# Patient Record
Sex: FEMALE | Race: WHITE | Employment: FULL TIME | URBAN - METROPOLITAN AREA
[De-identification: names, ages, dates, MRNs, and addresses within clinical notes are randomized per-mention and may not be internally consistent; named-entity substitution may affect disease eponyms.]

---

## 2021-03-22 ENCOUNTER — HOSPITAL ENCOUNTER (OUTPATIENT)
Dept: GENERAL RADIOLOGY | Age: 66
Discharge: HOME | End: 2021-03-22

## 2021-03-22 DIAGNOSIS — M25.572 LEFT ANKLE PAIN, UNSPECIFIED CHRONICITY: ICD-10-CM

## 2021-03-22 NOTE — PROGRESS NOTES
Negative X ray of left ankle. Pt called and notified of results. Pt was advised to continue with her antibiotis as prescribed and follow up in 1-2 weeks.

## 2021-07-07 ENCOUNTER — HOSPITAL ENCOUNTER (OUTPATIENT)
Dept: WOUND CARE | Age: 66
Discharge: HOME | End: 2021-07-07
Attending: PODIATRIST
Payer: COMMERCIAL

## 2021-07-07 VITALS — BODY MASS INDEX: 22.48 KG/M2 | WEIGHT: 157 LBS | HEIGHT: 70 IN

## 2021-07-07 DIAGNOSIS — L97.929 CHRONIC VENOUS HYPERTENSION WITH ULCER AND INFLAMMATION INVOLVING LEFT SIDE (HCC): Primary | ICD-10-CM

## 2021-07-07 DIAGNOSIS — L97.322 NON-PRESSURE CHRONIC ULCER OF LEFT ANKLE WITH FAT LAYER EXPOSED (HCC): ICD-10-CM

## 2021-07-07 DIAGNOSIS — I87.332 CHRONIC VENOUS HYPERTENSION WITH ULCER AND INFLAMMATION INVOLVING LEFT SIDE (HCC): Primary | ICD-10-CM

## 2021-07-07 PROCEDURE — 99213 OFFICE O/P EST LOW 20 MIN: CPT

## 2021-07-07 NOTE — WOUND CARE
07/07/21 0953   Wound Ankle Left;Lateral 07/07/21   Date First Assessed: 07/07/21   Wound Approximate Age at First Assessment (Weeks): 6 weeks  Primary Wound Type: Venous Ulcer  Location: Ankle  Wound Location Orientation: Left;Lateral  Date of First Observation: 07/07/21   Wound Image    Wound Etiology Venous   Cleansed Cleansed with saline   Dressing/Treatment Open to air   Wound Length (cm) 2 cm   Wound Width (cm) 0.5 cm   Wound Depth (cm) 0.1 cm   Wound Surface Area (cm^2) 1 cm^2   Wound Volume (cm^3) 0.1 cm^3   Wound Assessment Slough   Drainage Amount Moderate   Drainage Description Serosanguinous   Wound Odor None   Jenny-Wound/Incision Assessment Blanchable erythema   Wound Thickness Description Full thickness

## 2021-07-07 NOTE — WOUND CARE
[address]  [address], [address]  Phone: [phone]  Fax: [phone]    Patient: Marina Geller MRN: [id number]  SSN: [id number]    YOB: 1955  Age: 63 y.o. Sex: female       Return Appointment: 2 weeks with Gayatri Sandra DPM    Instructions: Left Lateral Ankle:  Cleanse with Normal Saline/Soap and Water  Apply Xeroform- apply to wound bed. Cover with Gauze/ABD  Wrap with Rolled Gauze/Coversite  Change Dressing Every Other Day    DO NOT GET WOUND WET-PURCHASE CAST COVER  [facility]    Referral Sent to Dr Roselia Jeong for Reflux Studies    Should you experience increased redness, swelling, pain, foul odor, size of wound(s), or have a temperature over 101 degrees please contact the [address] [place] [address] Road at [phone] or if after hours contact your primary care physician or go to the hospital emergency department.     Signed By: Julius Metcalf RN     July 7, 2021

## 2021-07-07 NOTE — DISCHARGE INSTRUCTIONS
[address]  [address], [address]  Phone: [phone]  Fax: [phone]    Patient: Sukumar Christiansen MRN: [id number]  SSN: [id number]    YOB: 1955  Age: 63 y.o. Sex: female       Return Appointment: 2 weeks with Alphonso Charles DPM    Instructions: Left Lateral Ankle:  Cleanse with Normal Saline/Soap and Water  Apply Xeroform- apply to wound bed. Cover with Gauze/ABD  Wrap with Rolled Gauze/Coversite  Change Dressing Every Other Day    DO NOT GET WOUND WET-PURCHASE CAST COVER  [facility]    Referral Sent to Dr Dorita Minor for Reflux Studies    Should you experience increased redness, swelling, pain, foul odor, size of wound(s), or have a temperature over 101 degrees please contact the [address] [place] [address] Road at [phone] or if after hours contact your primary care physician or go to the hospital emergency department.     Signed By: Lauren Watters RN     July 7, 2021

## 2021-07-07 NOTE — PROGRESS NOTES
[address] [place] [address] Drive Kike King [place] RECORD NUMBER:  [id number]  AGE: 63 y.o. RACE WHITE/NON-  GENDER: female  : 1955  EPISODE DATE:  2021    Subjective:     Chief Complaint   Patient presents with   Zac Muse is a 63 y.o. WHITE/NON- female who presents with a wound to the left lower extremity at the lateral ankle. This began in 2021. She reports pain and edema to the left leg and presented to [facility] and her PCP since it began. She has been on two courses of oral antibiotics as well as using mupirocin ointment with no relief. She reports drainage towards the end of the day. 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 She does not wear compression. She was referred to [facility] but would like a different referral to a vascular surgeon. She reports significant burning pain. HISTORY of PRESENT ILLNESS HPI    Joshua Willis: burning pain  Location: lateral left ankle  Duration: 2021  Onset: Patient states it started as burning pain, edema  Course: stable  Aggravating/Alleviating: Worsened withdependency, improved  rest  Treatment: mupirocin    Ulcer Identification:  Ulcer Type: venous    Contributing Factors: edema and venous stasis    Wound: venous         PAST MEDICAL HISTORY    History reviewed. No pertinent past medical history.      PAST SURGICAL HISTORY    Past Surgical History:   Procedure Laterality Date    HX GYN      AL BREAST SURGERY PROCEDURE UNLISTED      Cyst Removal       FAMILY HISTORY    Family History   Problem Relation Age of Onset    Heart Disease Mother     Heart Disease Father        SOCIAL HISTORY    Social History     Tobacco Use    Smoking status: Never Smoker    Smokeless tobacco: Never Used   Vaping Use    Vaping Use: Never used   Substance Use Topics    Alcohol use: Yes     Comment: Occasionally    Drug use: Never       ALLERGIES    No Known Allergies    MEDICATIONS    Current Outpatient Medications on File Prior to Encounter   Medication Sig Dispense Refill    [] traMADoL (ULTRAM) 50 mg tablet Take 1 Tablet by mouth every six (6) hours as needed for Pain for up to 7 days. Max Daily Amount: 200 mg. 28 Tablet 0    multivitamin (ONE A DAY) tablet Take 1 Tab by mouth daily. No current facility-administered medications on file prior to encounter. REVIEW OF SYSTEMS    Pertinent items are noted in HPI. Objective:     Visit Vitals  BP (!) (P) 128/96 (BP 1 Location: Left upper arm)   Pulse (P) 100   Temp (P) 97 °F (36.1 °C)   Resp (P) 20   Ht 5' 10\" (1.778 m)   Wt 71.2 kg (157 lb)   SpO2 (P) 99%   BMI 22.53 kg/m²       Wt Readings from Last 3 Encounters:   21 71.2 kg (157 lb)   21 73.5 kg (162 lb)   21 75.3 kg (166 lb)       PHYSICAL EXAM    General: well developed, well nourished, pleasant , NAD. Hygiene good  Psych: cooperative. Pleasant. No anxiety or depression. Normal mood and affect. HEENT: Normocephalic, atraumatic. EOMI. Conjunctiva clear. No scleral icterus. Neck: Normal range of motion. No masses. Chest: Good air entry bilaterally. Respirations nonlabored  Cardio: Normal heart sounds,no rubs, murmurs or gallops  Abdomen: Soft, nontender, nondistended, normoactive bowel sounds    Vascular: DP and PT pulses are palpable at 2/4 bilateral. Skin temperature is uniform from proximal to distal bilateral. Hair growth is absent bilateral. No erythema, edema, heat is appreciated bilateral. No evidence of cellulitis. Derm: No paronychial infections are noted. Skin is atrophic and xerotic. No subcutaneous masses or hyperpigmented lesions are present.    Neuro: Epicritic sensation is present bilateral. Protective sensation is present with 5.07 SWMF testing to all 10 sites bilateral. Muscle tone and bulk is symmetric bilateral.  Msk: Muscle strength is 5/5 for all prime movers of the foot bilateral. ROM of all joints is full and fluid without pain. No effusions are palpable. Full thickness ulceration is noted to the lateral left ankle. infammed rim with fibrous base. No  purulence or odor. Soft end feel with no bone exposed or palpable. No undermining or sinus track is noted. No fluctuance with palpation. Wound Ankle Left;Lateral 07/07/21 (Active)   Wound Image   07/07/21 0953   Wound Etiology Venous 07/07/21 0953   Cleansed Cleansed with saline 07/07/21 0953   Dressing/Treatment Open to air 07/07/21 0953   Wound Length (cm) 2 cm 07/07/21 0953   Wound Width (cm) 0.5 cm 07/07/21 0953   Wound Depth (cm) 0.1 cm 07/07/21 0953   Wound Surface Area (cm^2) 1 cm^2 07/07/21 0953   Wound Volume (cm^3) 0.1 cm^3 07/07/21 0953   Wound Assessment [facility] 07/07/21 0953   Drainage Amount Moderate 07/07/21 0953   Drainage Description Serosanguinous 07/07/21 0953   Wound Odor None 07/07/21 0953   Jenny-Wound/Incision Assessment Blanchable erythema 07/07/21 0953   Wound Thickness Description Full thickness 07/07/21 0953   Number of days: 0       DATA REVIEW:  No results found for this or any previous visit (from the past 336 hour(s)). Assessment/Plan:      Problem List Items Addressed This Visit        Circulatory    Chronic venous hypertension with ulcer and inflammation involving left side ([address] - Primary    Relevant Orders    REFERRAL TO VASCULAR SURGERY       Other    Non-pressure chronic ulcer of left ankle with fat layer exposed ([address]    Relevant Orders    REFERRAL TO VASCULAR SURGERY        Patient examined and evaluated. Educated patient and barriers to healing. Infection control is required to prevent loss of limb/life - maintain dressing coverage at all times, do not soak or get wet in the shower, wash hands before and after dressing changes. Monitor for erythema, edema, odor, and thick yellow drainage and contact the center immediately if noted.      Proper nutrition to support skin growth includes increased protein, vitamins and amino acids - animal based foods and Terry recommended. Moisture management to prevent maceration and dryness - keep wound covered at all times outside dressing care, do not \"air out\" or soak. Dressing will consist of xeroform every other day. Edema control provided by use of tubigrip until vascular studies are performed. Referral to Dr. Kristen Sterling for venous evaluation. Return in 2 weeks. Patient instructed on the following: Follow all wound dressing instructions. Do not get dressing or wound wet. May shower if wound can be effectively kept dry. Cast covers may be purchased at [facility]. Should you experience increased redness, swelling, pain, foul odor, size of wound(s), or have a temperature over 101 degrees please contact the [address] [place] [address] Road at [phone] or if after hours contact your primary care physician or go to the hospital emergency department. Orders Placed This Encounter    REFERRAL TO VASCULAR SURGERY     Standing Status:   Future     Standing Expiration Date:   8/7/2021     Referral Type:   Consultation     Referral Reason:   Specialty Services Required     Referred to Provider:   Sima Byers MD     Number of Visits Requested:   1    WOUND CARE, DRESSING CHANGE     Left Lateral Ankle:  Cleanse with Normal Saline/Soap and Water  Apply Xeroform- apply to wound bed.   Cover with Gauze/ABD  Wrap with Rolled Gauze/Coversite  Change Dressing Every Other Day  Tubigrip to Left Lower Leg    DO NOT GET WOUND WET-PURCHASE CAST COVER  [facility]    Referral Sent to Dr Sergio Kyle for Reflux Studies     Standing Status:   Standing     Number of Occurrences:   1       Follow-up Information     Follow up With Specialties Details Why Contact Info    [address] In 2 weeks For wound re-check Vicente Chung [place] [id number] [address] [phone] MDM  Number of Diagnoses or Management Options  Chronic venous hypertension with ulcer and inflammation involving left side ([address]): new, needed workup  Non-pressure chronic ulcer of left ankle with fat layer exposed ([address]): new, needed workup     Amount and/or Complexity of Data Reviewed  Review and summarize past medical records: yes (PCP and [facility] notes)    Risk of Complications, Morbidity, and/or Mortality  Presenting problems: moderate  Diagnostic procedures: moderate  Management options: moderate    Patient Progress  Patient progress: other (comment) (new)      Treatment Note please see attached Discharge Instructions    Written patient dismissal instructions given to patient or POA.          Electronically signed by Milton Varela DPM on 7/7/2021 at 11:05 AM

## 2021-07-14 ENCOUNTER — HOSPITAL ENCOUNTER (OUTPATIENT)
Dept: WOUND CARE | Age: 66
Discharge: HOME | End: 2021-07-14
Attending: PODIATRIST
Payer: COMMERCIAL

## 2021-07-14 VITALS
RESPIRATION RATE: 18 BRPM | WEIGHT: 158 LBS | DIASTOLIC BLOOD PRESSURE: 80 MMHG | HEART RATE: 79 BPM | BODY MASS INDEX: 22.62 KG/M2 | HEIGHT: 70 IN | SYSTOLIC BLOOD PRESSURE: 106 MMHG | TEMPERATURE: 98.4 F

## 2021-07-14 DIAGNOSIS — I87.332 CHRONIC VENOUS HYPERTENSION WITH ULCER AND INFLAMMATION INVOLVING LEFT SIDE (HCC): Primary | ICD-10-CM

## 2021-07-14 DIAGNOSIS — L97.322 NON-PRESSURE CHRONIC ULCER OF LEFT ANKLE WITH FAT LAYER EXPOSED (HCC): ICD-10-CM

## 2021-07-14 DIAGNOSIS — L97.929 CHRONIC VENOUS HYPERTENSION WITH ULCER AND INFLAMMATION INVOLVING LEFT SIDE (HCC): Primary | ICD-10-CM

## 2021-07-14 PROCEDURE — 99213 OFFICE O/P EST LOW 20 MIN: CPT

## 2021-07-14 RX ORDER — TRAMADOL HYDROCHLORIDE 50 MG/1
50 TABLET ORAL
COMMUNITY
End: 2021-09-13

## 2021-07-14 NOTE — PROGRESS NOTES
[address] [place] [address] Drive Kike King [place] RECORD NUMBER:  [id number]  AGE: 63 y.o. RACE WHITE/NON-  GENDER: female  : 1955  EPISODE DATE:  2021    Subjective:     Chief Complaint   Patient presents with    Wound Check     Left lateral ankle wound      Laurie Wyatt is a 63 y.o. WHITE/NON- female who presents with a wound to the left lower extremity at the lateral ankle. She called the center for an urgent appointment due to noting increasing pain and erythema yesterday. She started with pain  night and left the dressing off overnight to air it out. Monday she replaced he xeroform and cover and then again on Tuesday. She notes that the xeroform was very dry and hard at dressing change and felt better with a fresh piece. She has her appointment with the vascular doctor on 21. HISTORY of PRESENT ILLNESS HPI     Nature: burning pain  Location: lateral left ankle  Duration: 2021  Onset: Patient states it started as burning pain, edema  Course: stable  Aggravating/Alleviating: Worsened withdependency, improved  rest  Treatment: xeroform     Ulcer Identification:  Ulcer Type: venous     Contributing Factors: edema and venous stasis     Wound: venous         PAST MEDICAL HISTORY    History reviewed. No pertinent past medical history.      PAST SURGICAL HISTORY    Past Surgical History:   Procedure Laterality Date    HX GYN      MD BREAST SURGERY PROCEDURE UNLISTED      Cyst Removal       FAMILY HISTORY    Family History   Problem Relation Age of Onset    Heart Disease Mother     Heart Disease Father        SOCIAL HISTORY    Social History     Tobacco Use    Smoking status: Never Smoker    Smokeless tobacco: Never Used   Vaping Use    Vaping Use: Never used   Substance Use Topics    Alcohol use: Yes     Comment: Occasionally    Drug use: Never       ALLERGIES    No Known Allergies    MEDICATIONS    Current Outpatient Medications on File Prior to Encounter   Medication Sig Dispense Refill    traMADoL (ULTRAM) 50 mg tablet Take 50 mg by mouth every six (6) hours as needed for Pain. Takes 1/2 tab as needed      multivitamin (ONE A DAY) tablet Take 1 Tab by mouth daily. No current facility-administered medications on file prior to encounter. REVIEW OF SYSTEMS    Pertinent items are noted in HPI. Objective:     Visit Vitals  /80 (BP 1 Location: Right upper arm)   Pulse 79   Temp 98.4 °F (36.9 °C)   Resp 18   Ht 5' 10\" (1.778 m)   Wt 71.7 kg (158 lb)   BMI 22.67 kg/m²       Wt Readings from Last 3 Encounters:   07/14/21 71.7 kg (158 lb)   07/07/21 71.2 kg (157 lb)   06/17/21 73.5 kg (162 lb)       PHYSICAL EXAM    General: well developed, well nourished, pleasant , NAD. Hygiene good  Psych: cooperative. Pleasant. No anxiety or depression. Normal mood and affect. HEENT: Normocephalic, atraumatic. EOMI. Conjunctiva clear. No scleral icterus. Neck: Normal range of motion. No masses. Chest: Good air entry bilaterally. Respirations nonlabored  Cardio: Normal heart sounds,no rubs, murmurs or gallops  Abdomen: Soft, nontender, nondistended, normoactive bowel sounds    Vascular: DP and PT pulses are palpable at 2/4 bilateral. Skin temperature is uniform from proximal to distal bilateral. Hair growth is absent bilateral. No erythema, edema, heat is appreciated bilateral. No evidence of cellulitis. Derm: No paronychial infections are noted. Skin is atrophic and xerotic. No subcutaneous masses or hyperpigmented lesions are present. Neuro: Epicritic sensation is present bilateral. Protective sensation is present with 5.07 SWMF testing to all 10 sites bilateral. Muscle tone and bulk is symmetric bilateral.  Msk: Muscle strength is 5/5 for all prime movers of the foot bilateral. ROM of all joints is full and fluid without pain.  No effusions are palpable.       Full thickness ulceration is noted to the lateral left ankle. Reduced erythema from last week. Fibrous base. No  purulence or odor. Soft end feel with no bone exposed or palpable. No undermining or sinus track is noted. No fluctuance with palpation. Evaluated her cell phone picture from yesterday which showed a increased erythema and inflammation that is absent on exam today. Wound Ankle Left;Lateral 07/07/21 (Active)   Wound Image   07/14/21 0959   Wound Etiology Venous 07/14/21 0959   Dressing Status Clean; Intact 07/14/21 0959   Cleansed Cleansed with saline 07/14/21 0959   Dressing/Treatment Open to air 07/07/21 0953   Wound Length (cm) 1.7 cm 07/14/21 0959   Wound Width (cm) 0.7 cm 07/14/21 0959   Wound Depth (cm) 0.1 cm 07/14/21 0959   Wound Surface Area (cm^2) 1.19 cm^2 07/14/21 0959   Change in Wound Size % -19 07/14/21 0959   Wound Volume (cm^3) 0.119 cm^3 07/14/21 0959   Wound Healing % -19 07/14/21 0959   Wound Assessment [facility] 07/14/21 0959   Drainage Amount Scant 07/14/21 0959   Drainage Description Serosanguinous 07/14/21 0959   Wound Odor None 07/14/21 0959   Jenny-Wound/Incision Assessment Blanchable erythema 07/14/21 0959   Wound Thickness Description Full thickness 07/14/21 0959   Number of days: 7       DATA REVIEW:  No results found for this or any previous visit (from the past 336 hour(s)). Assessment/Plan:      Problem List Items Addressed This Visit        Circulatory    Chronic venous hypertension with ulcer and inflammation involving left side ([address] - Primary       Other    Non-pressure chronic ulcer of left ankle with fat layer exposed ([address]        Wound is improved from last week and shows no signs of the inflammation in the photo. It is likely due to the xeroform getting dry and acting as a focal cast to the wound. Change the dressing daily to keep it soft. Keep her appointment with vascular next week. Push our appointment here out to the week after.     Patient instructed on the following: Follow all wound dressing instructions. Do not get dressing or wound wet. May shower if wound can be effectively kept dry. Cast covers may be purchased at [facility] and [facility]. Should you experience increased redness, swelling, pain, foul odor, size of wound(s), or have a temperature over 101 degrees please contact the [address] [place] [address] Road at [phone] or if after hours contact your primary care physician or go to the hospital emergency department. Orders Placed This Encounter    WOUND CARE, DRESSING CHANGE     Left Lateral Ankle:  Cleanse with Normal Saline/Soap and Water  Apply Xeroform- apply to wound bed. Cover with Gauze/ABD  Wrap with Coversite  Change Dressing Daily.     DO NOT GET WOUND WET-PURCHASE CAST COVER  [facility]     Follow up with Dr Marilyn Morales for Reflux Studies on 7/20/21. Standing Status:   Standing     Number of Occurrences:   1    traMADoL (ULTRAM) 50 mg tablet     Sig: Take 50 mg by mouth every six (6) hours as needed for Pain. Takes 1/2 tab as needed       Follow-up Information     Follow up With Specialties Details Why Contact Info     [address] In 2 weeks For wound re-check [place] [address] [facility] [address] [phone]         MDM  Number of Diagnoses or Management Options  Chronic venous hypertension with ulcer and inflammation involving left side ([address]): established, improving  Non-pressure chronic ulcer of left ankle with fat layer exposed ([address]): established, improving  Risk of Complications, Morbidity, and/or Mortality  Presenting problems: low  Management options: low    Patient Progress  Patient progress: improved      Treatment Note please see attached Discharge Instructions    Written patient dismissal instructions given to patient or POA.          Electronically signed by Hal Irvin DPM on 7/14/2021 at 10:34 AM

## 2021-07-14 NOTE — PROGRESS NOTES
07/14/21 0959   Wound Ankle Left;Lateral 07/07/21   Date First Assessed: 07/07/21   Wound Approximate Age at First Assessment (Weeks): 6 weeks  Primary Wound Type: Venous Ulcer  Location: Ankle  Wound Location Orientation: Left;Lateral  Date of First Observation: 07/07/21   Wound Image    Wound Etiology Venous   Dressing Status Clean; Intact   Cleansed Cleansed with saline   Dressing/Treatment   (Xeroform, gauze, Coversite)   Wound Length (cm) 1.7 cm   Wound Width (cm) 0.7 cm   Wound Depth (cm) 0.1 cm   Wound Surface Area (cm^2) 1.19 cm^2   Change in Wound Size % -19   Wound Volume (cm^3) 0.119 cm^3   Wound Healing % -19   Wound Assessment Slough   Drainage Amount Scant   Drainage Description Serosanguinous   Wound Odor None   Jenny-Wound/Incision Assessment Blanchable erythema   Wound Thickness Description Full thickness

## 2021-07-14 NOTE — DISCHARGE INSTRUCTIONS
[address]  [address], [address]  Phone: [phone]  Fax: [phone]    Patient: John Segura MRN: [id number]  SSN: [id number]    YOB: 1955  Age: 63 y.o. Sex: female       Return Appointment: 2 weeks with Arlene Erwin DPM    Instructions: Left Lateral Ankle:  Cleanse with Normal Saline/Soap and Water  Apply Xeroform- apply to wound bed. Cover with Gauze/ABD  Wrap with Coversite  Change Dressing Daily.     DO NOT GET WOUND WET-PURCHASE CAST COVER  [facility]     Follow up with Dr Tera Galdamez for Reflux Studies on 7/20/21. Should you experience increased redness, swelling, pain, foul odor, size of wound(s), or have a temperature over 101 degrees please contact the [address] [place] [address] Road at [phone] or if after hours contact your primary care physician or go to the hospital emergency department.     Signed By: Shae Ziegler PT, [facility]     July 14, 2021

## 2021-07-28 ENCOUNTER — HOSPITAL ENCOUNTER (OUTPATIENT)
Dept: WOUND CARE | Age: 66
Discharge: HOME | End: 2021-07-28
Attending: PODIATRIST
Payer: COMMERCIAL

## 2021-07-28 VITALS
HEART RATE: 107 BPM | SYSTOLIC BLOOD PRESSURE: 121 MMHG | RESPIRATION RATE: 18 BRPM | TEMPERATURE: 97.9 F | WEIGHT: 158 LBS | BODY MASS INDEX: 22.62 KG/M2 | DIASTOLIC BLOOD PRESSURE: 85 MMHG | HEIGHT: 70 IN

## 2021-07-28 DIAGNOSIS — L97.929 CHRONIC VENOUS HYPERTENSION WITH ULCER AND INFLAMMATION INVOLVING LEFT SIDE (HCC): Primary | ICD-10-CM

## 2021-07-28 DIAGNOSIS — I87.332 CHRONIC VENOUS HYPERTENSION WITH ULCER AND INFLAMMATION INVOLVING LEFT SIDE (HCC): Primary | ICD-10-CM

## 2021-07-28 DIAGNOSIS — L97.322 NON-PRESSURE CHRONIC ULCER OF LEFT ANKLE WITH FAT LAYER EXPOSED (HCC): ICD-10-CM

## 2021-07-28 PROCEDURE — 99212 OFFICE O/P EST SF 10 MIN: CPT

## 2021-07-28 RX ORDER — LIDOCAINE HYDROCHLORIDE 20 MG/ML
JELLY TOPICAL ONCE
Status: CANCELLED | OUTPATIENT
Start: 2021-07-28 | End: 2021-07-28

## 2021-07-28 RX ORDER — LIDOCAINE HYDROCHLORIDE 40 MG/ML
SOLUTION TOPICAL ONCE
Status: CANCELLED | OUTPATIENT
Start: 2021-07-28 | End: 2021-07-28

## 2021-07-28 RX ORDER — BACITRACIN ZINC AND POLYMYXIN B SULFATE 500; 1000 [USP'U]/G; [USP'U]/G
OINTMENT TOPICAL ONCE
Status: CANCELLED | OUTPATIENT
Start: 2021-07-28 | End: 2021-07-28

## 2021-07-28 RX ORDER — SILVER SULFADIAZINE 10 G/1000G
CREAM TOPICAL ONCE
Status: CANCELLED | OUTPATIENT
Start: 2021-07-28 | End: 2021-07-28

## 2021-07-28 RX ORDER — GENTAMICIN SULFATE 1 MG/G
OINTMENT TOPICAL ONCE
Status: CANCELLED | OUTPATIENT
Start: 2021-07-28 | End: 2021-07-28

## 2021-07-28 RX ORDER — LIDOCAINE 50 MG/G
OINTMENT TOPICAL ONCE
Status: CANCELLED | OUTPATIENT
Start: 2021-07-28 | End: 2021-07-28

## 2021-07-28 RX ORDER — LIDOCAINE 40 MG/G
CREAM TOPICAL ONCE
Status: CANCELLED | OUTPATIENT
Start: 2021-07-28 | End: 2021-07-28

## 2021-07-28 RX ORDER — MUPIROCIN 20 MG/G
OINTMENT TOPICAL ONCE
Status: CANCELLED | OUTPATIENT
Start: 2021-07-28 | End: 2021-07-28

## 2021-07-28 RX ORDER — TRIAMCINOLONE ACETONIDE 1 MG/G
OINTMENT TOPICAL ONCE
Status: CANCELLED | OUTPATIENT
Start: 2021-07-28 | End: 2021-07-28

## 2021-07-28 RX ORDER — BETAMETHASONE DIPROPIONATE 0.5 MG/G
OINTMENT TOPICAL ONCE
Status: CANCELLED | OUTPATIENT
Start: 2021-07-28 | End: 2021-07-28

## 2021-07-28 RX ORDER — BACITRACIN 500 [USP'U]/G
OINTMENT TOPICAL ONCE
Status: CANCELLED | OUTPATIENT
Start: 2021-07-28 | End: 2021-07-28

## 2021-07-28 RX ORDER — CLOBETASOL PROPIONATE 0.5 MG/G
OINTMENT TOPICAL ONCE
Status: CANCELLED | OUTPATIENT
Start: 2021-07-28 | End: 2021-07-28

## 2021-07-28 NOTE — DISCHARGE INSTRUCTIONS
[address]  [address], [address]   Phone: [phone]  Fax: [phone]    Patient: Manjeet Melvin MRN: [id number]  SSN: [id number]    YOB: 1955  Age: 63 y.o. Sex: female       Return Appointment: 2 weeks with Cyndie Hale DPM    Instructions:  Left Lateral Ankle:  Cleanse with Normal Saline/Soap and Water  Apply Xeroform- apply to wound bed. Cover with Kaleta Yeyo with Coversite  Change Dressing Daily. Should you experience increased redness, swelling, pain, foul odor, size of wound(s), or have a temperature over 101 degrees please contact the [address] [place] [address] Road at [phone] or if after hours contact your primary care physician or go to the hospital emergency department.     Signed By: Anastasia Balbuena     July 28, 2021

## 2021-07-28 NOTE — PROGRESS NOTES
[address] [place] [address] RECORD NUMBER:  [id number]  AGE: 63 y.o. RACE WHITE/NON-  GENDER: female  : 1955  EPISODE DATE:  2021    Subjective:     Chief Complaint   Patient presents with    Follow-up     left ankle      Marko Hou is a 63 y.o. WHITE/NON- female who presents with a wound to the left lower extremity at the lateral ankle. She notes reduced pain and less drainage. She had her consultation with Dr. Judith Calle and reports that a clot was noted in a superficial vein behind her knee however Dr. Judith Calle told her this may be incidental due to the location and did not recommend intervention at this time. HISTORY of PRESENT ILLNESS HPI     Nature: burning pain  Location: lateral left ankle  Duration: 2021  Onset: Patient states it started as burning pain, edema  Course: improving  Aggravating/Alleviating: Worsened withdependency, improved  rest  Treatment: xeroform     Ulcer Identification:  Ulcer Type: venous     Contributing Factors: edema and venous stasis     Wound: venous         PAST MEDICAL HISTORY    History reviewed. No pertinent past medical history.      PAST SURGICAL HISTORY    Past Surgical History:   Procedure Laterality Date    HX GYN      RI BREAST SURGERY PROCEDURE UNLISTED      Cyst Removal       FAMILY HISTORY    Family History   Problem Relation Age of Onset    Heart Disease Mother     Heart Disease Father        SOCIAL HISTORY    Social History     Tobacco Use    Smoking status: Never Smoker    Smokeless tobacco: Never Used   Vaping Use    Vaping Use: Never used   Substance Use Topics    Alcohol use: Yes     Comment: Occasionally    Drug use: Never       ALLERGIES    No Known Allergies    MEDICATIONS    Current Outpatient Medications on File Prior to Encounter   Medication Sig Dispense Refill    traMADoL (ULTRAM) 50 mg tablet Take 50 mg by mouth every six (6) hours as needed for Pain. Takes 1/2 tab as needed      multivitamin (ONE A DAY) tablet Take 1 Tab by mouth daily. No current facility-administered medications on file prior to encounter. REVIEW OF SYSTEMS    Pertinent items are noted in HPI. Objective:     Visit Vitals  /85 (BP 1 Location: Left upper arm, BP Patient Position: Sitting)   Pulse (!) 107   Temp 97.9 °F (36.6 °C)   Resp 18   Ht 5' 10\" (1.778 m)   Wt 71.7 kg (158 lb)   BMI 22.67 kg/m²       Wt Readings from Last 3 Encounters:   07/28/21 71.7 kg (158 lb)   07/14/21 71.7 kg (158 lb)   07/07/21 71.2 kg (157 lb)       PHYSICAL EXAM    General: well developed, well nourished, pleasant , NAD. Hygiene good  Psych: cooperative. Pleasant. No anxiety or depression. Normal mood and affect. HEENT: Normocephalic, atraumatic. EOMI. Conjunctiva clear. No scleral icterus. Neck: Normal range of motion. No masses. Chest: Good air entry bilaterally. Respirations nonlabored  Cardio: Normal heart sounds,no rubs, murmurs or gallops  Abdomen: Soft, nontender, nondistended, normoactive bowel sounds    Vascular: DP and PT pulses are palpable at 2/4 bilateral. Skin temperature is uniform from proximal to distal bilateral. Hair growth is absent bilateral. No erythema, edema, heat is appreciated bilateral. No evidence of cellulitis. Derm: No paronychial infections are noted. Skin is atrophic and xerotic. No subcutaneous masses or hyperpigmented lesions are present. Neuro: Epicritic sensation is present bilateral. Protective sensation is present with 5.07 SWMF testing to all 10 sites bilateral. Muscle tone and bulk is symmetric bilateral.  Msk: Muscle strength is 5/5 for all prime movers of the foot bilateral. ROM of all joints is full and fluid without pain. No effusions are palpable.       Full thickness ulceration is noted to the lateral left ankle. Dry base. No  purulence or odor. Soft end feel with no bone exposed or palpable. No undermining or sinus track is noted. No fluctuance with palpation. Wound Ankle Left;Lateral 07/07/21 (Active)   Wound Image   07/28/21 0829   Wound Etiology Venous 07/28/21 0829   Dressing Status Clean;Dry; Intact 07/28/21 0829   Cleansed Cleansed with saline 07/28/21 0829   Dressing/Treatment Open to air 07/07/21 0953   Wound Length (cm) 0.3 cm 07/28/21 0829   Wound Width (cm) 0.3 cm 07/28/21 0829   Wound Depth (cm) 0.1 cm 07/28/21 0829   Wound Surface Area (cm^2) 0.09 cm^2 07/28/21 0829   Change in Wound Size % 91 07/28/21 0829   Wound Volume (cm^3) 0.009 cm^3 07/28/21 0829   Wound Healing % 91 07/28/21 0829   Wound Assessment [facility] 07/28/21 0829   Drainage Amount Scant 07/28/21 0829   Drainage Description Serous 07/28/21 0829   Wound Odor None 07/28/21 0829   Jenny-Wound/Incision Assessment Blanchable erythema 07/28/21 0829   Wound Thickness Description Full thickness 07/28/21 0829   Number of days: 21     DATA REVIEW:  No results found for this or any previous visit (from the past 336 hour(s)). Assessment/Plan:      Problem List Items Addressed This Visit        Circulatory    Chronic venous hypertension with ulcer and inflammation involving left side ([address] - Primary    Relevant Orders    INITIATE OUTPATIENT WOUND CARE PROTOCOL       Other    Non-pressure chronic ulcer of left ankle with fat layer exposed ([address]    Relevant Orders    INITIATE OUTPATIENT WOUND CARE PROTOCOL        Wound is improved. Continue daily xeroform changes and compression. Return in 2 weeks. Patient instructed on the following: Follow all wound dressing instructions. Do not get dressing or wound wet. May shower if wound can be effectively kept dry. Cast covers may be purchased at [facility] and [facility].     Should you experience increased redness, swelling, pain, foul odor, size of wound(s), or have a temperature over 101 degrees please contact the [address] [place] [address] Road at [phone] or if after hours contact your primary care physician or go to the hospital emergency department. Follow-up Information     Follow up With Specialties Details Why Contact Info    [address] In 2 weeks  [address] [place] [id number] [facility] [address] [phone]         MDM  Number of Diagnoses or Management Options  Chronic venous hypertension with ulcer and inflammation involving left side ([address]): established, improving  Non-pressure chronic ulcer of left ankle with fat layer exposed ([address]): established, improving  Risk of Complications, Morbidity, and/or Mortality  Presenting problems: low  Management options: low    Patient Progress  Patient progress: improved      Treatment Note please see attached Discharge Instructions    Written patient dismissal instructions given to patient or POA.          Electronically signed by Katya Cazares DPM on 7/28/2021 at 10:34 AM

## 2021-07-28 NOTE — WOUND CARE
07/28/21 0829   Wound Ankle Left;Lateral 07/07/21   Date First Assessed: 07/07/21   Wound Approximate Age at First Assessment (Weeks): 6 weeks  Primary Wound Type: Venous Ulcer  Location: Ankle  Wound Location Orientation: Left;Lateral  Date of First Observation: 07/07/21   Wound Image    Wound Etiology Venous   Dressing Status Clean;Dry; Intact   Cleansed Cleansed with saline   Dressing/Treatment   (xeroform, gauze)   Wound Length (cm) 0.3 cm   Wound Width (cm) 0.3 cm   Wound Depth (cm) 0.1 cm   Wound Surface Area (cm^2) 0.09 cm^2   Change in Wound Size % 91   Wound Volume (cm^3) 0.009 cm^3   Wound Healing % 91   Wound Assessment Slough   Drainage Amount Scant   Drainage Description Serous   Wound Odor None   Jenny-Wound/Incision Assessment Blanchable erythema   Wound Thickness Description Full thickness

## 2021-07-28 NOTE — WOUND CARE
[address], [address]  Phone: [phone]  Fax: [phone]    Patient: Sean Rowan MRN: [id number]  SSN: [id number]    YOB: 1955  Age: 63 y.o. Sex: female       Return Appointment: 2 weeks with Katty Nguyen DPM    Instructions:  Left Lateral Ankle:  Cleanse with Normal Saline/Soap and Water  Apply Xeroform- apply to wound bed. Cover with Gauze/ABD or secure with border foam.  Change Dressing Daily. Should you experience increased redness, swelling, pain, foul odor, size of wound(s), or have a temperature over 101 degrees please contact the [address] [place] [address] Road at [phone] or if after hours contact your primary care physician or go to the hospital emergency department.     Signed By: Brian Hong     July 28, 2021

## 2021-08-11 ENCOUNTER — HOSPITAL ENCOUNTER (OUTPATIENT)
Dept: WOUND CARE | Age: 66
Discharge: HOME | End: 2021-08-11
Attending: PODIATRIST
Payer: COMMERCIAL

## 2021-08-11 VITALS — DIASTOLIC BLOOD PRESSURE: 89 MMHG | RESPIRATION RATE: 18 BRPM | HEART RATE: 111 BPM | SYSTOLIC BLOOD PRESSURE: 131 MMHG

## 2021-08-11 DIAGNOSIS — L97.322 NON-PRESSURE CHRONIC ULCER OF LEFT ANKLE WITH FAT LAYER EXPOSED (HCC): ICD-10-CM

## 2021-08-11 DIAGNOSIS — I87.332 CHRONIC VENOUS HYPERTENSION WITH ULCER AND INFLAMMATION INVOLVING LEFT SIDE (HCC): Primary | ICD-10-CM

## 2021-08-11 DIAGNOSIS — L97.929 CHRONIC VENOUS HYPERTENSION WITH ULCER AND INFLAMMATION INVOLVING LEFT SIDE (HCC): Primary | ICD-10-CM

## 2021-08-11 PROCEDURE — 99212 OFFICE O/P EST SF 10 MIN: CPT

## 2021-08-11 RX ORDER — LIDOCAINE HYDROCHLORIDE 20 MG/ML
JELLY TOPICAL ONCE
Status: CANCELLED | OUTPATIENT
Start: 2021-08-11 | End: 2021-08-11

## 2021-08-11 RX ORDER — BACITRACIN 500 [USP'U]/G
OINTMENT TOPICAL ONCE
Status: CANCELLED | OUTPATIENT
Start: 2021-08-11 | End: 2021-08-11

## 2021-08-11 RX ORDER — LIDOCAINE 50 MG/G
OINTMENT TOPICAL ONCE
Status: CANCELLED | OUTPATIENT
Start: 2021-08-11 | End: 2021-08-11

## 2021-08-11 RX ORDER — BACITRACIN ZINC AND POLYMYXIN B SULFATE 500; 1000 [USP'U]/G; [USP'U]/G
OINTMENT TOPICAL ONCE
Status: CANCELLED | OUTPATIENT
Start: 2021-08-11 | End: 2021-08-11

## 2021-08-11 RX ORDER — BETAMETHASONE DIPROPIONATE 0.5 MG/G
OINTMENT TOPICAL ONCE
Status: CANCELLED | OUTPATIENT
Start: 2021-08-11 | End: 2021-08-11

## 2021-08-11 RX ORDER — GENTAMICIN SULFATE 1 MG/G
OINTMENT TOPICAL ONCE
Status: CANCELLED | OUTPATIENT
Start: 2021-08-11 | End: 2021-08-11

## 2021-08-11 RX ORDER — SILVER SULFADIAZINE 10 G/1000G
CREAM TOPICAL ONCE
Status: CANCELLED | OUTPATIENT
Start: 2021-08-11 | End: 2021-08-11

## 2021-08-11 RX ORDER — CLOBETASOL PROPIONATE 0.5 MG/G
OINTMENT TOPICAL ONCE
Status: CANCELLED | OUTPATIENT
Start: 2021-08-11 | End: 2021-08-11

## 2021-08-11 RX ORDER — TRIAMCINOLONE ACETONIDE 1 MG/G
OINTMENT TOPICAL ONCE
Status: CANCELLED | OUTPATIENT
Start: 2021-08-11 | End: 2021-08-11

## 2021-08-11 RX ORDER — LIDOCAINE 40 MG/G
CREAM TOPICAL ONCE
Status: CANCELLED | OUTPATIENT
Start: 2021-08-11 | End: 2021-08-11

## 2021-08-11 RX ORDER — LIDOCAINE HYDROCHLORIDE 40 MG/ML
SOLUTION TOPICAL ONCE
Status: CANCELLED | OUTPATIENT
Start: 2021-08-11 | End: 2021-08-11

## 2021-08-11 RX ORDER — MUPIROCIN 20 MG/G
OINTMENT TOPICAL ONCE
Status: CANCELLED | OUTPATIENT
Start: 2021-08-11 | End: 2021-08-11

## 2021-08-11 NOTE — WOUND CARE
[address]  [address], [address]   Phone: [phone]  Fax: [phone]    Patient: Lilibeth Bangura MRN: [id number]  SSN: [id number]    YOB: 1955  Age: 63 y.o. Sex: female       Return Appointment: 2 weeks with Trace Alarcon DPM    Instructions: left lateral ankle  Cleanse wound with normal saline. Xeroform- apply to wound bed. Cover with gauze, secure with paper tape. Hydrocortisone cream 1% applied to surrounding skin today. Change dressing daily. Should you experience increased redness, swelling, pain, foul odor, size of wound(s), or have a temperature over 101 degrees please contact the [address] [place] [address] Road at [phone] or if after hours contact your primary care physician or go to the hospital emergency department.     Signed By: Sidney Foote RN     August 11, 2021

## 2021-08-11 NOTE — PROGRESS NOTES
[address] [place] [address] Drive [address] [place] RECORD NUMBER:  [id number]  AGE: 63 y.o. RACE WHITE/NON-  GENDER: female  : 1955  EPISODE DATE:  2021    Subjective:     Chief Complaint   Patient presents with    Follow-up     right ankle      Manjeet Melvin is a 63 y.o. WHITE/NON- female who presents with a wound to the left lower extremity at the lateral ankle. She notes reduced pain and scant drainage. She is using tubigrip for compression. She denies pain remaining. HISTORY of PRESENT ILLNESS HPI     Nature: painless  Location: lateral left ankle  Duration: 2021  Onset: Patient states it started as burning pain, edema  Course: improving  Aggravating/Alleviating: Worsened withdependency, improved  rest  Treatment: xeroform     Ulcer Identification:  Ulcer Type: venous     Contributing Factors: edema and venous stasis     Wound: venous         PAST MEDICAL HISTORY    History reviewed. No pertinent past medical history. PAST SURGICAL HISTORY    Past Surgical History:   Procedure Laterality Date    HX GYN      DC BREAST SURGERY PROCEDURE UNLISTED      Cyst Removal       FAMILY HISTORY    Family History   Problem Relation Age of Onset    Heart Disease Mother     Heart Disease Father        SOCIAL HISTORY    Social History     Tobacco Use    Smoking status: Never Smoker    Smokeless tobacco: Never Used   Vaping Use    Vaping Use: Never used   Substance Use Topics    Alcohol use: Yes     Comment: Occasionally    Drug use: Never       ALLERGIES    No Known Allergies    MEDICATIONS    Current Outpatient Medications on File Prior to Encounter   Medication Sig Dispense Refill    traMADoL (ULTRAM) 50 mg tablet Take 50 mg by mouth every six (6) hours as needed for Pain. Takes 1/2 tab as needed      multivitamin (ONE A DAY) tablet Take 1 Tab by mouth daily.        No current facility-administered medications on file prior to encounter. REVIEW OF SYSTEMS    Pertinent items are noted in HPI. Objective:     Visit Vitals  /89 (BP 1 Location: Right upper arm, BP Patient Position: Sitting)   Pulse (!) 111   Resp 18       Wt Readings from Last 3 Encounters:   07/28/21 71.7 kg (158 lb)   07/14/21 71.7 kg (158 lb)   07/07/21 71.2 kg (157 lb)       PHYSICAL EXAM    General: well developed, well nourished, pleasant , NAD. Hygiene good  Psych: cooperative. Pleasant. No anxiety or depression. Normal mood and affect. HEENT: Normocephalic, atraumatic. EOMI. Conjunctiva clear. No scleral icterus. Neck: Normal range of motion. No masses. Chest: Good air entry bilaterally. Respirations nonlabored  Cardio: Normal heart sounds,no rubs, murmurs or gallops  Abdomen: Soft, nontender, nondistended, normoactive bowel sounds    Vascular: DP and PT pulses are palpable at 2/4 bilateral. Skin temperature is uniform from proximal to distal bilateral. Hair growth is absent bilateral. No erythema, edema, heat is appreciated bilateral. No evidence of cellulitis. Derm: No paronychial infections are noted. Skin is atrophic and xerotic. No subcutaneous masses or hyperpigmented lesions are present. Neuro: Epicritic sensation is present bilateral. Protective sensation is present with 5.07 SWMF testing to all 10 sites bilateral. Muscle tone and bulk is symmetric bilateral.  Msk: Muscle strength is 5/5 for all prime movers of the foot bilateral. ROM of all joints is full and fluid without pain. No effusions are palpable.       Full thickness ulceration is noted to the lateral left ankle. Dry base. No  purulence or odor. Soft end feel with no bone exposed or palpable. No undermining or sinus track is noted. No fluctuance with palpation. Significant reduction in size with minimal opening remaining.       Wound Ankle Left;Lateral 07/07/21 (Active)   Wound Image   08/11/21 0920   Wound Etiology Venous 08/11/21 0920   Dressing Status Clean;Dry; Intact 08/11/21 0920   Cleansed Cleansed with saline 08/11/21 0920   Dressing/Treatment Open to air 07/07/21 0953   Wound Length (cm) 0.1 cm 08/11/21 0920   Wound Width (cm) 0.1 cm 08/11/21 0920   Wound Depth (cm) 0.1 cm 08/11/21 0920   Wound Surface Area (cm^2) 0.01 cm^2 08/11/21 0920   Change in Wound Size % 99 08/11/21 0920   Wound Volume (cm^3) 0.001 cm^3 08/11/21 0920   Wound Healing % 99 08/11/21 0920   Wound Assessment Slough 08/11/21 0920   Drainage Amount Scant 08/11/21 0920   Drainage Description Serous 08/11/21 0920   Wound Odor None 08/11/21 0920   Jenny-Wound/Incision Assessment Blanchable erythema 08/11/21 0920   Wound Thickness Description Full thickness 08/11/21 0920   Number of days: 35          DATA REVIEW:  No results found for this or any previous visit (from the past 336 hour(s)). Assessment/Plan:      Problem List Items Addressed This Visit        Circulatory    Chronic venous hypertension with ulcer and inflammation involving left side ([address] - Primary    Relevant Orders    INITIATE OUTPATIENT WOUND CARE PROTOCOL       Other    Non-pressure chronic ulcer of left ankle with fat layer exposed ([facility]    Relevant Orders    INITIATE OUTPATIENT WOUND CARE PROTOCOL        Wound is significantly improved and near healed. Continue daily xeroform changes and compression. Return in 2 weeks. Patient instructed on the following: Follow all wound dressing instructions. Do not get dressing or wound wet. May shower if wound can be effectively kept dry. Cast covers may be purchased at [facility] and [facility]. Should you experience increased redness, swelling, pain, foul odor, size of wound(s), or have a temperature over 101 degrees please contact the [address] [place] [address] Road at [phone] or if after hours contact your primary care physician or go to the hospital emergency department.          MDM  Number of Diagnoses or Management Options  Chronic venous hypertension with ulcer and inflammation involving left side ([address]): established, improving  Non-pressure chronic ulcer of left ankle with fat layer exposed ([facility]): established, improving  Risk of Complications, Morbidity, and/or Mortality  Presenting problems: low  Management options: low    Patient Progress  Patient progress: improved      Treatment Note please see attached Discharge Instructions    Written patient dismissal instructions given to patient or POA.          Electronically signed by Ella Mccormack DPM on 8/11/2021 at 10:34 AM

## 2021-08-11 NOTE — WOUND CARE
08/11/21 0920   Wound Ankle Left;Lateral 07/07/21   Date First Assessed: 07/07/21   Wound Approximate Age at First Assessment (Weeks): 6 weeks  Primary Wound Type: Venous Ulcer  Location: Ankle  Wound Location Orientation: Left;Lateral  Date of First Observation: 07/07/21   Wound Image    Wound Etiology Venous   Dressing Status Clean;Dry; Intact   Cleansed Cleansed with saline   Dressing/Treatment   (xerform, coversite)   Wound Length (cm) 0.1 cm   Wound Width (cm) 0.1 cm   Wound Depth (cm) 0.1 cm   Wound Surface Area (cm^2) 0.01 cm^2   Change in Wound Size % 99   Wound Volume (cm^3) 0.001 cm^3   Wound Healing % 99   Wound Assessment Slough   Drainage Amount Scant   Drainage Description Serous   Wound Odor None   Jenny-Wound/Incision Assessment Blanchable erythema   Wound Thickness Description Full thickness

## 2021-08-11 NOTE — DISCHARGE INSTRUCTIONS
[address]  [address], [address]  Phone: [phone]  Fax: [phone]    Patient: Theodore Champion MRN: [id number]  SSN: [id number]    YOB: 1955  Age: 63 y.o. Sex: female       Return Appointment: 2 weeks with Jeet Vaz DPM    Instructions:  left lateral ankle  Cleanse wound with normal saline. Xeroform- apply to wound bed. Cover with gauze, secure with paper tape. Hydrocortisone cream 1% applied to surrounding skin today. Change dressing daily. Should you experience increased redness, swelling, pain, foul odor, size of wound(s), or have a temperature over 101 degrees please contact the [address] [place] [address] Road at [phone] or if after hours contact your primary care physician or go to the hospital emergency department.     Signed By: Yazmin Pascual RN     August 11, 2021

## 2021-08-25 ENCOUNTER — HOSPITAL ENCOUNTER (OUTPATIENT)
Dept: WOUND CARE | Age: 66
Discharge: HOME | End: 2021-08-25
Attending: PODIATRIST
Payer: COMMERCIAL

## 2021-08-25 VITALS
SYSTOLIC BLOOD PRESSURE: 119 MMHG | TEMPERATURE: 99.1 F | HEIGHT: 70 IN | DIASTOLIC BLOOD PRESSURE: 76 MMHG | BODY MASS INDEX: 22.9 KG/M2 | HEART RATE: 115 BPM | OXYGEN SATURATION: 99 % | RESPIRATION RATE: 18 BRPM | WEIGHT: 160 LBS

## 2021-08-25 DIAGNOSIS — L97.929 CHRONIC VENOUS HYPERTENSION WITH ULCER AND INFLAMMATION INVOLVING LEFT SIDE (HCC): Primary | ICD-10-CM

## 2021-08-25 DIAGNOSIS — I87.332 CHRONIC VENOUS HYPERTENSION WITH ULCER AND INFLAMMATION INVOLVING LEFT SIDE (HCC): Primary | ICD-10-CM

## 2021-08-25 DIAGNOSIS — L97.322 NON-PRESSURE CHRONIC ULCER OF LEFT ANKLE WITH FAT LAYER EXPOSED (HCC): ICD-10-CM

## 2021-08-25 PROCEDURE — 99212 OFFICE O/P EST SF 10 MIN: CPT

## 2021-08-25 RX ORDER — BACITRACIN ZINC AND POLYMYXIN B SULFATE 500; 1000 [USP'U]/G; [USP'U]/G
OINTMENT TOPICAL ONCE
Status: CANCELLED | OUTPATIENT
Start: 2021-08-25 | End: 2021-08-25

## 2021-08-25 RX ORDER — LIDOCAINE 50 MG/G
OINTMENT TOPICAL ONCE
Status: CANCELLED | OUTPATIENT
Start: 2021-08-25 | End: 2021-08-25

## 2021-08-25 RX ORDER — BACITRACIN 500 [USP'U]/G
OINTMENT TOPICAL ONCE
Status: CANCELLED | OUTPATIENT
Start: 2021-08-25 | End: 2021-08-25

## 2021-08-25 RX ORDER — SILVER SULFADIAZINE 10 G/1000G
CREAM TOPICAL ONCE
Status: CANCELLED | OUTPATIENT
Start: 2021-08-25 | End: 2021-08-25

## 2021-08-25 RX ORDER — CLOBETASOL PROPIONATE 0.5 MG/G
OINTMENT TOPICAL ONCE
Status: CANCELLED | OUTPATIENT
Start: 2021-08-25 | End: 2021-08-25

## 2021-08-25 RX ORDER — TRIAMCINOLONE ACETONIDE 1 MG/G
OINTMENT TOPICAL ONCE
Status: CANCELLED | OUTPATIENT
Start: 2021-08-25 | End: 2021-08-25

## 2021-08-25 RX ORDER — LIDOCAINE HYDROCHLORIDE 20 MG/ML
JELLY TOPICAL ONCE
Status: CANCELLED | OUTPATIENT
Start: 2021-08-25 | End: 2021-08-25

## 2021-08-25 RX ORDER — MUPIROCIN 20 MG/G
OINTMENT TOPICAL ONCE
Status: CANCELLED | OUTPATIENT
Start: 2021-08-25 | End: 2021-08-25

## 2021-08-25 RX ORDER — LIDOCAINE 40 MG/G
CREAM TOPICAL ONCE
Status: CANCELLED | OUTPATIENT
Start: 2021-08-25 | End: 2021-08-25

## 2021-08-25 RX ORDER — LIDOCAINE HYDROCHLORIDE 40 MG/ML
SOLUTION TOPICAL ONCE
Status: CANCELLED | OUTPATIENT
Start: 2021-08-25 | End: 2021-08-25

## 2021-08-25 RX ORDER — BETAMETHASONE DIPROPIONATE 0.5 MG/G
OINTMENT TOPICAL ONCE
Status: CANCELLED | OUTPATIENT
Start: 2021-08-25 | End: 2021-08-25

## 2021-08-25 RX ORDER — GENTAMICIN SULFATE 1 MG/G
OINTMENT TOPICAL ONCE
Status: CANCELLED | OUTPATIENT
Start: 2021-08-25 | End: 2021-08-25

## 2021-08-25 NOTE — WOUND CARE
[address]  [address], [address]  Phone: [phone]  Fax: [phone]    Patient: Isai Ruano MRN: [id number]  SSN: [id number]    YOB: 1955  Age: 63 y.o. Sex: female       Return Appointment: Discharge from wound center at this time. Instructions: Left lateral ankle  Wound is healed! Patient may shower as previously. No need for dressings at this time. Discharge from wound center at this time.       Should you experience increased redness, swelling, pain, foul odor, size of wound(s), or have a temperature over 101 degrees please contact the [address] [place] [address] Road at [phone] or if after hours contact your primary care physician or go to the hospital emergency department.     Signed By: Wali Berman PT, [facility]     August 25, 2021

## 2021-08-25 NOTE — PROGRESS NOTES
08/25/21 0959   Wound Ankle Left;Lateral 07/07/21   Date First Assessed: 07/07/21   Wound Approximate Age at First Assessment (Weeks): 6 weeks  Primary Wound Type: Venous Ulcer  Location: Ankle  Wound Location Orientation: Left;Lateral  Date of First Observation: 07/07/21   Wound Image    Wound Etiology Venous   Dressing Status Clean;Dry; Intact   Cleansed Cleansed with saline   Dressing/Treatment   (Xeroform, band-aid)   Wound Length (cm) 0 cm   Wound Width (cm) 0 cm   Wound Depth (cm) 0 cm   Wound Surface Area (cm^2) 0 cm^2   Change in Wound Size % 100   Wound Volume (cm^3) 0 cm^3   Wound Healing % 100   Wound Assessment Epithelialization   Drainage Amount None   Wound Odor None   Jenny-Wound/Incision Assessment Blanchable erythema

## 2021-08-25 NOTE — PROGRESS NOTES
[address] [place] [address] Drive Kike [place] RECORD NUMBER:  [id number]  AGE: 63 y.o. RACE WHITE/NON-  GENDER: female  : 1955  EPISODE DATE:  2021    Subjective:     Chief Complaint   Patient presents with    Wound Check     Left lateral ankle wound      Sukumar Christiansen is a 63 y.o. WHITE/NON- female who presents with a wound to the left lower extremity at the lateral ankle. She notes no pain remaining and no drainage. HISTORY of PRESENT ILLNESS HPI     Nature: painless  Location: lateral left ankle  Duration: 2021  Onset: Patient states it started as burning pain, edema  Course: resolved  Aggravating/Alleviating: Worsened withdependency, improved  rest  Treatment: xeroform     Ulcer Identification:  Ulcer Type: venous     Contributing Factors: edema and venous stasis     Wound: venous         PAST MEDICAL HISTORY    History reviewed. No pertinent past medical history. PAST SURGICAL HISTORY    Past Surgical History:   Procedure Laterality Date    HX GYN      DC BREAST SURGERY PROCEDURE UNLISTED      Cyst Removal       FAMILY HISTORY    Family History   Problem Relation Age of Onset    Heart Disease Mother     Heart Disease Father        SOCIAL HISTORY    Social History     Tobacco Use    Smoking status: Never Smoker    Smokeless tobacco: Never Used   Vaping Use    Vaping Use: Never used   Substance Use Topics    Alcohol use: Yes     Comment: Occasionally    Drug use: Never       ALLERGIES    No Known Allergies    MEDICATIONS    Current Outpatient Medications on File Prior to Encounter   Medication Sig Dispense Refill    traMADoL (ULTRAM) 50 mg tablet Take 50 mg by mouth every six (6) hours as needed for Pain. Takes 1/2 tab as needed      multivitamin (ONE A DAY) tablet Take 1 Tab by mouth daily. No current facility-administered medications on file prior to encounter. REVIEW OF SYSTEMS    Pertinent items are noted in HPI. Objective:     Visit Vitals  /76 (BP 1 Location: Left arm)   Pulse (!) 115   Temp 99.1 °F (37.3 °C)   Resp 18   Ht 5' 10\" (1.778 m)   Wt 72.6 kg (160 lb)   SpO2 99%   BMI 22.96 kg/m²       Wt Readings from Last 3 Encounters:   08/25/21 72.6 kg (160 lb)   07/28/21 71.7 kg (158 lb)   07/14/21 71.7 kg (158 lb)       PHYSICAL EXAM    General: well developed, well nourished, pleasant , NAD. Hygiene good  Psych: cooperative. Pleasant. No anxiety or depression. Normal mood and affect. HEENT: Normocephalic, atraumatic. EOMI. Conjunctiva clear. No scleral icterus. Neck: Normal range of motion. No masses. Chest: Good air entry bilaterally. Respirations nonlabored  Cardio: Normal heart sounds,no rubs, murmurs or gallops  Abdomen: Soft, nontender, nondistended, normoactive bowel sounds    Vascular: DP and PT pulses are palpable at 2/4 bilateral. Skin temperature is uniform from proximal to distal bilateral. Hair growth is absent bilateral. No erythema, edema, heat is appreciated bilateral. No evidence of cellulitis. Derm: No paronychial infections are noted. Skin is atrophic and xerotic. No subcutaneous masses or hyperpigmented lesions are present. Neuro: Epicritic sensation is present bilateral. Protective sensation is present with 5.07 SWMF testing to all 10 sites bilateral. Muscle tone and bulk is symmetric bilateral.  Msk: Muscle strength is 5/5 for all prime movers of the foot bilateral. ROM of all joints is full and fluid without pain. No effusions are palpable.       Prior full thickness ulceration to the lateral left ankle with epithelium to the base. No drainage. Wound Ankle Left;Lateral 07/07/21 (Active)   Wound Image   08/25/21 0959   Wound Etiology Venous 08/25/21 0959   Dressing Status Clean;Dry; Intact 08/25/21 0959   Cleansed Cleansed with saline 08/25/21 0959   Dressing/Treatment Open to air 07/07/21 0953   Wound Length (cm) 0 cm 08/25/21 0959   Wound Width (cm) 0 cm 08/25/21 0959   Wound Depth (cm) 0 cm 08/25/21 0959   Wound Surface Area (cm^2) 0 cm^2 08/25/21 0959   Change in Wound Size % 100 08/25/21 0959   Wound Volume (cm^3) 0 cm^3 08/25/21 0959   Wound Healing % 100 08/25/21 0959   Wound Assessment Epithelialization 08/25/21 0959   Drainage Amount None 08/25/21 0959   Drainage Description Serous 08/11/21 0920   Wound Odor None 08/25/21 0959   Jenny-Wound/Incision Assessment Blanchable erythema 08/25/21 0959   Wound Thickness Description Full thickness 08/11/21 0920   Number of days: 49        DATA REVIEW:  No results found for this or any previous visit (from the past 336 hour(s)). Assessment/Plan:      Problem List Items Addressed This Visit        Circulatory    Chronic venous hypertension with ulcer and inflammation involving left side ([address] - Primary    Relevant Orders    INITIATE OUTPATIENT WOUND CARE PROTOCOL       Other    Non-pressure chronic ulcer of left ankle with fat layer exposed ([address]    Relevant Orders    INITIATE OUTPATIENT WOUND CARE PROTOCOL        Wound is resolved. She may return to normal hygiene. She asked about a pedicure and while this is permissible she is not to allow hot towels, massge or lotion the the lower left leg to prevent any damage to the new skin. She may also return to the swimming pool. Return here as needed. Patient instructed on the following:  Should you experience increased redness, swelling, pain, foul odor, size of wound(s), or have a temperature over 101 degrees please contact the [address] [place] [address] Road at [phone] or if after hours contact your primary care physician or go to the hospital emergency department.          MDM  Number of Diagnoses or Management Options  Chronic venous hypertension with ulcer and inflammation involving left side ([address]): established, improving  Non-pressure chronic ulcer of left ankle with fat layer exposed ([address]): established, improving  Risk of Complications, Morbidity, and/or Mortality  Presenting problems: minimal  Management options: minimal    Patient Progress  Patient progress: resolved      Treatment Note please see attached Discharge Instructions    Written patient dismissal instructions given to patient or POA.          Electronically signed by Wei Bass DPM on 8/25/2021 at 10:34 AM

## 2021-08-25 NOTE — DISCHARGE INSTRUCTIONS
[address]  [address], [address]  Phone: [phone]  Fax: [phone]    Patient: Demetrio Lawrence MRN: [id number]  SSN: [id number]    YOB: 1955  Age: 63 y.o. Sex: female       Return Appointment: Discharge from wound center at this time. Instructions: Left lateral ankle  Wound is healed! Patient may shower as previously. No need for dressings at this time. Discharge from wound center at this time. Should you experience increased redness, swelling, pain, foul odor, size of wound(s), or have a temperature over 101 degrees please contact the [address] [place] [address] Road at [phone] or if after hours contact your primary care physician or go to the hospital emergency department.     Signed By: Autumn Dumont PT, [facility]     August 25, 2021

## 2021-09-16 ENCOUNTER — TRANSCRIBE ORDER (OUTPATIENT)
Dept: SCHEDULING | Age: 66
End: 2021-09-16

## 2021-09-16 DIAGNOSIS — Z12.31 SCREENING MAMMOGRAM FOR HIGH-RISK PATIENT: Primary | ICD-10-CM

## 2022-03-19 PROBLEM — L97.322 NON-PRESSURE CHRONIC ULCER OF LEFT ANKLE WITH FAT LAYER EXPOSED (HCC): Status: ACTIVE | Noted: 2021-07-07

## 2022-03-20 PROBLEM — I87.332 CHRONIC VENOUS HYPERTENSION WITH ULCER AND INFLAMMATION INVOLVING LEFT SIDE (HCC): Status: ACTIVE | Noted: 2021-07-07

## 2022-03-20 PROBLEM — L97.929 CHRONIC VENOUS HYPERTENSION WITH ULCER AND INFLAMMATION INVOLVING LEFT SIDE (HCC): Status: ACTIVE | Noted: 2021-07-07

## 2022-12-23 ENCOUNTER — OFFICE VISIT (OUTPATIENT)
Dept: FAMILY MEDICINE CLINIC | Facility: CLINIC | Age: 67
End: 2022-12-23
Payer: COMMERCIAL

## 2022-12-23 VITALS
DIASTOLIC BLOOD PRESSURE: 84 MMHG | SYSTOLIC BLOOD PRESSURE: 130 MMHG | BODY MASS INDEX: 24.2 KG/M2 | HEIGHT: 70 IN | WEIGHT: 169 LBS

## 2022-12-23 DIAGNOSIS — R79.89 ELEVATED BRAIN NATRIURETIC PEPTIDE (BNP) LEVEL: Primary | ICD-10-CM

## 2022-12-23 PROCEDURE — 99213 OFFICE O/P EST LOW 20 MIN: CPT | Performed by: FAMILY MEDICINE

## 2022-12-23 PROCEDURE — 1123F ACP DISCUSS/DSCN MKR DOCD: CPT | Performed by: FAMILY MEDICINE

## 2022-12-23 ASSESSMENT — ENCOUNTER SYMPTOMS
NAUSEA: 0
SHORTNESS OF BREATH: 0
VOMITING: 0

## 2022-12-23 ASSESSMENT — PATIENT HEALTH QUESTIONNAIRE - PHQ9
1. LITTLE INTEREST OR PLEASURE IN DOING THINGS: 0
2. FEELING DOWN, DEPRESSED OR HOPELESS: 0
SUM OF ALL RESPONSES TO PHQ QUESTIONS 1-9: 0
SUM OF ALL RESPONSES TO PHQ9 QUESTIONS 1 & 2: 0
SUM OF ALL RESPONSES TO PHQ QUESTIONS 1-9: 0

## 2022-12-27 ENCOUNTER — NURSE ONLY (OUTPATIENT)
Dept: FAMILY MEDICINE CLINIC | Facility: CLINIC | Age: 67
End: 2022-12-27
Payer: COMMERCIAL

## 2022-12-27 DIAGNOSIS — R79.89 ELEVATED BRAIN NATRIURETIC PEPTIDE (BNP) LEVEL: ICD-10-CM

## 2022-12-27 DIAGNOSIS — E55.9 VITAMIN D DEFICIENCY, UNSPECIFIED: ICD-10-CM

## 2022-12-27 DIAGNOSIS — Z00.00 LABORATORY EXAMINATION ORDERED AS PART OF A ROUTINE GENERAL MEDICAL EXAMINATION: Primary | ICD-10-CM

## 2022-12-27 LAB
25(OH)D3 SERPL-MCNC: 19.1 NG/ML (ref 30–100)
ALBUMIN SERPL-MCNC: 4.1 G/DL (ref 3.2–4.6)
ALBUMIN/GLOB SERPL: 1.4 {RATIO} (ref 0.4–1.6)
ALP SERPL-CCNC: 138 U/L (ref 50–136)
ALT SERPL-CCNC: 33 U/L (ref 12–65)
ANION GAP SERPL CALC-SCNC: 9 MMOL/L (ref 2–11)
AST SERPL-CCNC: 22 U/L (ref 15–37)
BILIRUB SERPL-MCNC: 0.7 MG/DL (ref 0.2–1.1)
BILIRUBIN, URINE, POC: NEGATIVE
BLOOD URINE, POC: ABNORMAL
BUN SERPL-MCNC: 18 MG/DL (ref 8–23)
CALCIUM SERPL-MCNC: 8.7 MG/DL (ref 8.3–10.4)
CHLORIDE SERPL-SCNC: 108 MMOL/L (ref 101–110)
CHOLEST SERPL-MCNC: 179 MG/DL
CO2 SERPL-SCNC: 24 MMOL/L (ref 21–32)
CREAT SERPL-MCNC: 0.7 MG/DL (ref 0.6–1)
GLOBULIN SER CALC-MCNC: 3 G/DL (ref 2.8–4.5)
GLUCOSE SERPL-MCNC: 102 MG/DL (ref 65–100)
GLUCOSE URINE, POC: NEGATIVE
GRANS ABSOLUTE, POC: 3.8 K/UL
GRANULOCYTES %, POC: 68.1 %
HDLC SERPL-MCNC: 52 MG/DL (ref 40–60)
HDLC SERPL: 3.4 {RATIO}
HEMATOCRIT, POC: 46.6 %
HEMOGLOBIN, POC: 14.8 G/DL
KETONES, URINE, POC: NEGATIVE
LDLC SERPL CALC-MCNC: 109 MG/DL
LEUKOCYTE ESTERASE, URINE, POC: NEGATIVE
LYMPHOCYTE %, POC: 24.5 %
LYMPHS ABSOLUTE, POC: 1.4 K/UL
MCH, POC: 31.6 PG (ref 40–?)
MCHC, POC: 31.8
MCV, POC: 94.9
MONOCYTE %, POC: 7.4 %
MONOCYTE, ABSOLUTE POC: 0.4 K/UL
MPV, POC: 8.6 FL
NITRITE, URINE, POC: NEGATIVE
NT PRO BNP: 348 PG/ML (ref 5–125)
PH, URINE, POC: 6 (ref 4.6–8)
PLATELET COUNT, POC: 304 K/UL
POTASSIUM SERPL-SCNC: 4.4 MMOL/L (ref 3.5–5.1)
PROT SERPL-MCNC: 7.1 G/DL (ref 6.3–8.2)
PROTEIN,URINE, POC: NEGATIVE
RBC, POC: 4.69 M/UL
RDW, POC: 11.9 %
SODIUM SERPL-SCNC: 141 MMOL/L (ref 133–143)
SPECIFIC GRAVITY, URINE, POC: 1.03 (ref 1–1.03)
TRIGL SERPL-MCNC: 90 MG/DL (ref 35–150)
TSH, 3RD GENERATION: 0.68 UIU/ML (ref 0.36–3.74)
URINALYSIS CLARITY, POC: CLEAR
URINALYSIS COLOR, POC: YELLOW
UROBILINOGEN, POC: NORMAL
VLDLC SERPL CALC-MCNC: 18 MG/DL (ref 6–23)
WBC, POC: 5.6 K/UL

## 2022-12-27 PROCEDURE — 81002 URINALYSIS NONAUTO W/O SCOPE: CPT | Performed by: FAMILY MEDICINE

## 2022-12-27 PROCEDURE — 85025 COMPLETE CBC W/AUTO DIFF WBC: CPT | Performed by: FAMILY MEDICINE

## 2022-12-28 LAB
HIV 1+2 AB+HIV1 P24 AG SERPL QL IA: NONREACTIVE
HIV 1/2 RESULT COMMENT: NORMAL

## 2023-01-06 ENCOUNTER — OFFICE VISIT (OUTPATIENT)
Dept: FAMILY MEDICINE CLINIC | Facility: CLINIC | Age: 68
End: 2023-01-06

## 2023-01-06 VITALS
HEIGHT: 70 IN | WEIGHT: 167 LBS | DIASTOLIC BLOOD PRESSURE: 70 MMHG | SYSTOLIC BLOOD PRESSURE: 120 MMHG | BODY MASS INDEX: 23.91 KG/M2

## 2023-01-06 DIAGNOSIS — R31.9 HEMATURIA, UNSPECIFIED TYPE: ICD-10-CM

## 2023-01-06 DIAGNOSIS — R31.1 BENIGN ESSENTIAL MICROSCOPIC HEMATURIA: ICD-10-CM

## 2023-01-06 DIAGNOSIS — Z53.20 PAP SMEAR OF CERVIX DECLINED: ICD-10-CM

## 2023-01-06 DIAGNOSIS — E55.9 VITAMIN D DEFICIENCY: Primary | ICD-10-CM

## 2023-01-06 DIAGNOSIS — R79.89 ELEVATED BRAIN NATRIURETIC PEPTIDE (BNP) LEVEL: ICD-10-CM

## 2023-01-06 DIAGNOSIS — Z12.31 SCREENING MAMMOGRAM FOR HIGH-RISK PATIENT: ICD-10-CM

## 2023-01-06 DIAGNOSIS — Z12.11 SPECIAL SCREENING FOR MALIGNANT NEOPLASMS, COLON: ICD-10-CM

## 2023-01-06 DIAGNOSIS — Z78.0 POSTMENOPAUSAL: ICD-10-CM

## 2023-01-06 LAB
BILIRUBIN, URINE, POC: NEGATIVE
BLOOD URINE, POC: NEGATIVE
GLUCOSE URINE, POC: NEGATIVE
KETONES, URINE, POC: NEGATIVE
LEUKOCYTE ESTERASE, URINE, POC: NEGATIVE
NITRITE, URINE, POC: NEGATIVE
PH, URINE, POC: 5.5 (ref 4.6–8)
PROTEIN,URINE, POC: NEGATIVE
SPECIFIC GRAVITY, URINE, POC: 1.03 (ref 1–1.03)
URINALYSIS CLARITY, POC: CLEAR
URINALYSIS COLOR, POC: YELLOW
UROBILINOGEN, POC: NORMAL

## 2023-01-06 RX ORDER — ACETAMINOPHEN 160 MG
TABLET,DISINTEGRATING ORAL
COMMUNITY

## 2023-01-06 ASSESSMENT — ENCOUNTER SYMPTOMS
COUGH: 0
ABDOMINAL PAIN: 0
SHORTNESS OF BREATH: 0

## 2023-01-06 ASSESSMENT — PATIENT HEALTH QUESTIONNAIRE - PHQ9
SUM OF ALL RESPONSES TO PHQ QUESTIONS 1-9: 0
SUM OF ALL RESPONSES TO PHQ QUESTIONS 1-9: 0
SUM OF ALL RESPONSES TO PHQ9 QUESTIONS 1 & 2: 0
1. LITTLE INTEREST OR PLEASURE IN DOING THINGS: 0
2. FEELING DOWN, DEPRESSED OR HOPELESS: 0
SUM OF ALL RESPONSES TO PHQ QUESTIONS 1-9: 0
SUM OF ALL RESPONSES TO PHQ QUESTIONS 1-9: 0

## 2023-01-06 NOTE — PROGRESS NOTES
PROGRESS NOTE    SUBJECTIVE:   José Miguel Moya is a 74 y.o. female seen for a follow up visit regarding the following chief complaint:     Chief Complaint   Patient presents with    Annual Exam    Discuss Labs           HPI patient presents office today for complete physical and work-up of an elevated BNP and blood in her urine today the patient states that she wants another doctor to do her Pap smears she is not comfortable with me. Past Medical History, Past Surgical History, Family history, Social History, and Medications were all reviewed with the patient today and updated as necessary. Current Outpatient Medications   Medication Sig Dispense Refill    Cholecalciferol (VITAMIN D3) 50 MCG (2000 UT) CAPS Take by mouth      Multiple Vitamins-Minerals (MULTIVITAMIN ADULT, MINERALS, PO) Take 1 tablet by mouth daily      ascorbic acid (VITAMIN C) 500 MG tablet Take by mouth daily       No current facility-administered medications for this visit. No Known Allergies  Patient Active Problem List   Diagnosis    Non-pressure chronic ulcer of left ankle with fat layer exposed [facility]    Chronic venous hypertension with ulcer and inflammation involving left side ([facility]     No past medical history on file. Past Surgical History:   Procedure Laterality Date    BREAST SURGERY      Cyst Removal    GYN       Family History   Problem Relation Age of Onset    Heart Disease Father     Heart Disease Mother      Social History     Tobacco Use    Smoking status: Never     Passive exposure: Never    Smokeless tobacco: Never   Substance Use Topics    Alcohol use: Yes         Review of Systems   Constitutional:  Negative for chills and fever. Respiratory:  Negative for cough and shortness of breath. Cardiovascular:  Negative for chest pain. Gastrointestinal:  Negative for abdominal pain. Endocrine: Negative for cold intolerance and heat intolerance.    Genitourinary:  Negative for difficulty urinating, frequency, hematuria, pelvic pain, vaginal bleeding, vaginal discharge and vaginal pain. Neurological:  Negative for headaches. Psychiatric/Behavioral: Negative. OBJECTIVE:  /70 (Site: Left Upper Arm, Position: Sitting, Cuff Size: Small Adult)   Ht 5' 10\" (1.778 m)   Wt 167 lb (75.8 kg)   BMI 23.96 kg/m²      Physical Exam  Vitals and nursing note reviewed. Constitutional:       Appearance: Normal appearance. HENT:      Head: Normocephalic and atraumatic. Nose: Nose normal.      Mouth/Throat:      Mouth: Mucous membranes are moist.      Pharynx: No oropharyngeal exudate or posterior oropharyngeal erythema. Eyes:      Extraocular Movements: Extraocular movements intact. Conjunctiva/sclera: Conjunctivae normal.      Pupils: Pupils are equal, round, and reactive to light. Cardiovascular:      Rate and Rhythm: Normal rate and regular rhythm. Pulses: Normal pulses. Heart sounds: Normal heart sounds. Pulmonary:      Effort: Pulmonary effort is normal.      Breath sounds: Normal breath sounds. Abdominal:      General: Abdomen is flat. Bowel sounds are normal.      Palpations: Abdomen is soft. Genitourinary:     Comments: Deferred done by OB/GYN  Musculoskeletal:         General: Normal range of motion. Cervical back: Normal range of motion and neck supple. Skin:     General: Skin is warm. Capillary Refill: Capillary refill takes less than 2 seconds. Neurological:      General: No focal deficit present. Mental Status: She is alert and oriented to person, place, and time. Psychiatric:         Mood and Affect: Mood normal.         Behavior: Behavior normal.         Thought Content: Thought content normal.         Judgment: Judgment normal.        Medical problems and test results were reviewed with the patient today.      Recent Results (from the past 672 hour(s))   Brain Natriuretic Peptide    Collection Time: 12/27/22 11:53 AM   Result Value Ref Range    NT Pro- (H) 5 - 125 PG/ML   Vitamin D 25 Hydroxy    Collection Time: 12/27/22 11:53 AM   Result Value Ref Range    Vit D, 25-Hydroxy 19.1 (L) 30.0 - 100.0 ng/mL   TSH    Collection Time: 12/27/22 11:53 AM   Result Value Ref Range    TSH, 3RD GENERATION 0.677 0.358 - 3.740 uIU/mL   Comprehensive Metabolic Panel    Collection Time: 12/27/22 11:53 AM   Result Value Ref Range    Sodium 141 133 - 143 mmol/L    Potassium 4.4 3.5 - 5.1 mmol/L    Chloride 108 101 - 110 mmol/L    CO2 24 21 - 32 mmol/L    Anion Gap 9 2 - 11 mmol/L    Glucose 102 (H) 65 - 100 mg/dL    BUN 18 8 - 23 MG/DL    Creatinine 0.70 0.6 - 1.0 MG/DL    Est, Glom Filt Rate >60 >60 ml/min/1.73m2    Calcium 8.7 8.3 - 10.4 MG/DL    Total Bilirubin 0.7 0.2 - 1.1 MG/DL    ALT 33 12 - 65 U/L    AST 22 15 - 37 U/L    Alk Phosphatase 138 (H) 50 - 136 U/L    Total Protein 7.1 6.3 - 8.2 g/dL    Albumin 4.1 3.2 - 4.6 g/dL    Globulin 3.0 2.8 - 4.5 g/dL    Albumin/Globulin Ratio 1.4 0.4 - 1.6     Lipid Panel    Collection Time: 12/27/22 11:53 AM   Result Value Ref Range    Cholesterol, Total 179 <200 MG/DL    Triglycerides 90 35 - 150 MG/DL    HDL 52 40 - 60 MG/DL    LDL Calculated 109 (H) <100 MG/DL    VLDL Cholesterol Calculated 18 6.0 - 23.0 MG/DL    Chol/HDL Ratio 3.4     HIV 1/2 Ag/Ab, 4TH Generation,W Rflx Confirm    Collection Time: 12/27/22 11:53 AM   Result Value Ref Range    HIV 1/2 Interp NONREACTIVE NONREACTIVE      HIV 1/2 Result Comment SEE NOTE     AMB POC URINALYSIS DIP STICK MANUAL W/O MICRO    Collection Time: 12/27/22 11:56 AM   Result Value Ref Range    Color (UA POC) Yellow     Clarity (UA POC) Clear     Glucose, Urine, POC Negative Negative    Bilirubin, Urine, POC Negative Negative    Ketones, Urine, POC Negative Negative    Specific Gravity, Urine, POC 1.030 1.001 - 1.035    Blood (UA POC) 1+ Negative    pH, Urine, POC 6.0 4.6 - 8.0    Protein, Urine, POC Negative Negative    Urobilinogen, POC Normal     Nitrite, Urine, POC Negative Negative    Leukocyte Esterase, Urine, POC Negative Negative   AMB POC KTY COMPLETE CBC    Collection Time: 12/27/22 11:57 AM   Result Value Ref Range    WBC, POC 5.6 K/uL    Lymphocyte % 24.5 %    Monocyte % 7.4 %    Granulocytes %, POC 68.1 %    Lymphs Abs 1.4 K/uL    Monocyte Absolute, POC 0.4 K/uL    Granulocytes Abs 3.8 K/uL    RBC, POC 4.69 M/uL    Hemoglobin, POC 14.8 G/DL    Hematocrit, POC 46.6 %    MCV 94.9     MCH 31.6 (A) 40 pg    MCHC 31.8     RDW, POC 11.9 %    Platelet Count,  K/UL    MPV POC 8.6 fL   AMB POC URINALYSIS DIP STICK AUTO W/O MICRO    Collection Time: 01/06/23  9:36 AM   Result Value Ref Range    Color, Urine, POC yellow     Clarity, Urine, POC clear     Glucose, Urine, POC Negative Negative    Bilirubin, Urine, POC Negative Negative    Ketones, Urine, POC Negative Negative    Specific Gravity, Urine, POC 1.030 1.001 - 1.035    Blood, Urine, POC negative Negative    pH, Urine, POC 5.5 4.6 - 8.0    Protein, Urine, POC Negative Negative    Urobilinogen, POC normal     Nitrate, Urine, POC negative Negative    Leukocyte Esterase, Urine, POC Negative Negative       ASSESSMENT and PLAN    Visit Diagnoses and Associated Orders       Vitamin D deficiency    -  Primary         Elevated brain natriuretic peptide (BNP) level        [facility] Custom]           Hematuria, unspecified type        AMB POC URINALYSIS DIP STICK AUTO W/O MICRO [28429 CPT(R)]           Benign essential microscopic hematuria             Pap smear of cervix declined        [facility] - Karen Chatman MD, OB/GYN, [facility] Custom]           Screening mammogram for high-risk patient        [facility] DIGITAL SCREEN W OR WO CAD BILATERAL [21922 Custom]   - Future Order         Special screening for malignant neoplasms, colon        [facility] - [facility] - Colonoscopy [SPS100 Custom]           Postmenopausal        DEXA BONE DENSITY AXIAL SKELETON [87307 Custom]   - Future Order         ORDERS WITHOUT AN ASSOCIATED DIAGNOSIS    Cholecalciferol (VITAMIN D3) 50 MCG (2000 UT) CAPS [21316]                  Diagnosis Orders   1. Vitamin D deficiency        2. Elevated brain natriuretic peptide (BNP) level  [address]      3. Hematuria, unspecified type  AMB POC URINALYSIS DIP STICK AUTO W/O MICRO      4. Benign essential microscopic hematuria        5. Pap smear of cervix declined  Karan Gallagher MD, OB/GYN, [address] [place] [address]      6. Screening mammogram for high-risk patient  [facility] DIGITAL SCREEN W OR WO CAD BILATERAL      7. Special screening for malignant neoplasms, colon  [facility] - Colonoscopy      8. Postmenopausal  DEXA BONE DENSITY AXIAL SKELETON      , Mustapha Coy was seen today for annual exam and discuss labs. Diagnoses and all orders for this visit:    Vitamin D deficiency    Elevated brain natriuretic peptide (BNP) level  -     [address]    Hematuria, unspecified type  -     AMB POC URINALYSIS DIP STICK AUTO W/O MICRO    Benign essential microscopic hematuria  -     Cancel: AMB POC URINALYSIS DIP STICK MANUAL W/ MICRO [facility]    Pap smear of cervix declined  -     [facility] - Anant Maciel MD, OB/GYN, [place]    Screening mammogram for high-risk patient  -     [facility] DIGITAL SCREEN W OR WO CAD BILATERAL;  Future    Special screening for malignant neoplasms, colon  -     [facility] - Colonoscopy    Postmenopausal  -     DEXA BONE DENSITY AXIAL SKELETON; Future  , Reviewed her labs answered all her questions counseling supportive care gone over she still has an elevated BNP with possibility of congestive heart failure in the differential we will refer to cardiology for work-up she is asymptomatic as of now reviewed her labs answered all her questions counseling supportive care repeat UA in the office came back negative for blood in her urine precautions given kidney stone precautions given recommended low-cholesterol diet and exercise and recheck her cholesterol in the near future

## 2023-01-16 ENCOUNTER — CLINICAL DOCUMENTATION (OUTPATIENT)
Dept: SURGERY | Age: 68
End: 2023-01-16

## 2023-01-16 NOTE — PROGRESS NOTES
Referral received for routine colonoscopy- screening or surveillance only. Chart reviewed by me on January 16, 2023.  Did not schedule w/referral 2021    Pt found to be acceptable candidate for direct scheduling if they are interested with the following special instructions (if any):

## 2023-01-19 ENCOUNTER — PREP FOR PROCEDURE (OUTPATIENT)
Dept: SURGERY | Age: 68
End: 2023-01-19

## 2023-01-19 PROBLEM — Z12.11 SCREEN FOR COLON CANCER: Status: ACTIVE | Noted: 2023-01-19

## 2023-01-19 RX ORDER — SOD SULF/POT CHLORIDE/MAG SULF 1.479 G
12 TABLET ORAL 2 TIMES DAILY
Qty: 24 TABLET | Refills: 0 | Status: SHIPPED | OUTPATIENT
Start: 2023-01-19

## 2023-01-31 ENCOUNTER — TELEPHONE (OUTPATIENT)
Dept: FAMILY MEDICINE CLINIC | Facility: CLINIC | Age: 68
End: 2023-01-31

## 2023-01-31 DIAGNOSIS — R79.89 ELEVATED BRAIN NATRIURETIC PEPTIDE (BNP) LEVEL: Primary | ICD-10-CM

## 2023-02-18 PROBLEM — Z12.11 SCREEN FOR COLON CANCER: Status: RESOLVED | Noted: 2023-01-19 | Resolved: 2023-02-18

## 2023-02-28 ENCOUNTER — PREP FOR PROCEDURE (OUTPATIENT)
Dept: SURGERY | Age: 68
End: 2023-02-28

## 2023-02-28 PROBLEM — Z12.11 COLON CANCER SCREENING: Status: ACTIVE | Noted: 2023-02-28

## 2023-03-15 ENCOUNTER — INITIAL CONSULT (OUTPATIENT)
Dept: CARDIOLOGY CLINIC | Age: 68
End: 2023-03-15
Payer: COMMERCIAL

## 2023-03-15 VITALS
HEIGHT: 70 IN | WEIGHT: 169 LBS | HEART RATE: 108 BPM | BODY MASS INDEX: 24.2 KG/M2 | DIASTOLIC BLOOD PRESSURE: 64 MMHG | SYSTOLIC BLOOD PRESSURE: 120 MMHG

## 2023-03-15 DIAGNOSIS — I48.19 PERSISTENT ATRIAL FIBRILLATION (HCC): ICD-10-CM

## 2023-03-15 DIAGNOSIS — R79.89 ELEVATED BRAIN NATRIURETIC PEPTIDE (BNP) LEVEL: ICD-10-CM

## 2023-03-15 DIAGNOSIS — R06.09 DOE (DYSPNEA ON EXERTION): ICD-10-CM

## 2023-03-15 DIAGNOSIS — R60.9 EDEMA, UNSPECIFIED TYPE: Primary | ICD-10-CM

## 2023-03-15 PROCEDURE — 99203 OFFICE O/P NEW LOW 30 MIN: CPT | Performed by: INTERNAL MEDICINE

## 2023-03-15 PROCEDURE — 1123F ACP DISCUSS/DSCN MKR DOCD: CPT | Performed by: INTERNAL MEDICINE

## 2023-03-15 PROCEDURE — 93000 ELECTROCARDIOGRAM COMPLETE: CPT | Performed by: INTERNAL MEDICINE

## 2023-03-15 RX ORDER — METOPROLOL SUCCINATE 25 MG/1
25 TABLET, EXTENDED RELEASE ORAL DAILY
Qty: 30 TABLET | Refills: 5 | Status: SHIPPED | OUTPATIENT
Start: 2023-03-15

## 2023-03-15 RX ORDER — CHLORAL HYDRATE 500 MG
CAPSULE ORAL DAILY
COMMUNITY

## 2023-03-15 ASSESSMENT — ENCOUNTER SYMPTOMS
ORTHOPNEA: 0
BOWEL INCONTINENCE: 0
HEMATOCHEZIA: 0
HOARSE VOICE: 0
SWOLLEN GLANDS: 0
ABDOMINAL PAIN: 0
COLOR CHANGE: 0
SORE THROAT: 0
SHORTNESS OF BREATH: 1
WHEEZING: 0
RHINORRHEA: 0
SPUTUM PRODUCTION: 0
DIARRHEA: 0
HEMOPTYSIS: 0
HEMATEMESIS: 0
VOMITING: 0
BLURRED VISION: 0

## 2023-03-15 NOTE — PROGRESS NOTES
[facility] CARDIOLOGY  [address], [address]  PHONE: [phone]        03/15/23    NAME:  Aarti Allen  : 1955  MRN: [id number]       SUBJECTIVE:   Aarti Allen is a 74 y.o. female seen for a consultation visit regarding the following: The patient is referred by Dr Yuliya Dumont for evaluation of an elevated NT Pro-BNP level. Routine screening labs on 2022 reported a NT Pro-BNP of 348 ( 5-125). She states that she applied for a life insurance policy in 2308. Insurance labs included a BNP in 2022. She reports her initial BNP level in 2022 was >600. Therefore,the BNP level was repeated in 2022. She admits to a 1 year hx of noticeable GARNER and easy fatigue. She denies chest pain,palpitations,PND,orthopnea,or peripheral edema. Chief Complaint   Patient presents with    Edema    Shortness of Breath            HPI:  Consultation is requested by [unfilled] for evaluation of Edema and Shortness of Breath   . Shortness of Breath  This is a recurrent problem. Episode onset: 1 year ago. Episode frequency: Occurs with walking especially stairs or inclines. Associated symptoms include leg pain (She reports leg pain associated withn slow healing LLE wound in . ). Pertinent negatives include no abdominal pain, chest pain, claudication, coryza, ear pain, fever, headaches, hemoptysis, leg swelling, neck pain, orthopnea, PND, rash, rhinorrhea, sore throat, sputum production, swollen glands, syncope, vomiting or wheezing. The symptoms are aggravated by exercise. Past Medical History, Past Surgical History, Family history, Social History, and Medications were all reviewed with the patient today and updated as necessary.        No Known Allergies    Current Outpatient Medications:     Omega-3 Fatty Acids (FISH OIL) 1000 MG capsule, Take by mouth daily, Disp: , Rfl:     LUTEIN PO, Take by mouth, Disp: , Rfl:     apixaban (ELIQUIS) 5 MG TABS tablet, Take 1 tablet by mouth 2 times daily, Disp: 60 tablet, Rfl: 5    metoprolol succinate (TOPROL XL) 25 MG extended release tablet, Take 1 tablet by mouth daily, Disp: 30 tablet, Rfl: 5    Cholecalciferol (VITAMIN D3) 50 MCG (2000 UT) CAPS, Take by mouth, Disp: , Rfl:     Multiple Vitamins-Minerals (MULTIVITAMIN ADULT, MINERALS, PO), Take 1 tablet by mouth daily, Disp: , Rfl:     ascorbic acid (VITAMIN C) 500 MG tablet, Take by mouth daily, Disp: , Rfl:   History reviewed. No pertinent past medical history. Past Surgical History:   Procedure Laterality Date    BREAST SURGERY      Cyst Removal    GYN       Family History   Problem Relation Age of Onset    Heart Disease Father     Heart Disease Mother      Social History     Tobacco Use    Smoking status: Never     Passive exposure: Never    Smokeless tobacco: Never   Substance Use Topics    Alcohol use: Yes       ROS:    Review of Systems   Constitutional: Negative for chills, decreased appetite, diaphoresis, fever and malaise/fatigue. HENT:  Negative for congestion, ear pain, hearing loss, hoarse voice, nosebleeds, rhinorrhea and sore throat. Eyes:  Negative for blurred vision. Cardiovascular:  Positive for dyspnea on exertion. Negative for chest pain, claudication, cyanosis, irregular heartbeat, leg swelling, near-syncope, orthopnea, palpitations, paroxysmal nocturnal dyspnea and syncope. Respiratory:  Positive for shortness of breath. Negative for hemoptysis, sputum production and wheezing. Endocrine: Negative for polydipsia, polyphagia and polyuria. Skin:  Negative for color change and rash. Musculoskeletal:  Negative for neck pain. Gastrointestinal:  Negative for abdominal pain, bowel incontinence, diarrhea, hematemesis, hematochezia and vomiting. Genitourinary:  Negative for dysuria, frequency and hematuria. Neurological:  Negative for focal weakness, headaches, light-headedness, loss of balance, numbness, sensory change and weakness.    Psychiatric/Behavioral: Negative for altered mental status and memory loss. PHYSICAL EXAM:   /64   Pulse (!) 108   Ht 5' 10\" (1.778 m)   Wt 169 lb (76.7 kg)   BMI 24.25 kg/m²      Physical Exam  Constitutional:       Appearance: Normal appearance. HENT:      Head: Normocephalic and atraumatic. Nose: Nose normal.   Eyes:      Extraocular Movements: Extraocular movements intact. Pupils: Pupils are equal, round, and reactive to light. Neck:      Vascular: No carotid bruit. Cardiovascular:      Rate and Rhythm: Rhythm irregular. Pulses: Normal pulses. Heart sounds: No murmur heard. Pulmonary:      Effort: Pulmonary effort is normal.      Breath sounds: Normal breath sounds. Abdominal:      General: Abdomen is flat. Bowel sounds are normal.      Palpations: Abdomen is soft. Musculoskeletal:         General: Normal range of motion. Cervical back: Normal range of motion and neck supple. Comments: Bilateral superficial varicose veins   Skin:     General: Skin is warm and dry. Neurological:      General: No focal deficit present. Mental Status: She is alert and oriented to person, place, and time. Psychiatric:         Mood and Affect: Mood normal.       Medical problems and test results were reviewed with the patient today. Results for orders placed or performed in visit on 03/15/23   Extended cardiac holter monitor (48 hrs - 15 days)   Result Value Ref Range    Body Surface Area 1.95 m2   Results for orders placed or performed in visit on 03/15/23   EKG 12 Lead    Impression    Atrial fibrillation  -irregular conduction   -Right axis -consider right ventricular hypertrophy. Low voltage -possible pulmonary disease.      ABNORMAL            Recent Results (from the past 672 hour(s))   Extended cardiac holter monitor (48 hrs - 15 days)    Collection Time: 03/15/23  4:24 PM   Result Value Ref Range    Body Surface Area 1.95 m2     Lab Results   Component Value Date/Time    CHOL 179 12/27/2022 11:53 AM    HDL 52 12/27/2022 11:53 AM    VLDL 15 08/26/2021 09:25 AM       ASSESSMENT and PLAN    Thomas ARREGUIN was seen today for edema and shortness of breath. Diagnoses and all orders for this visit:    Edema, unspecified type  -     EKG 12 Lead    GARNER (dyspnea on exertion):Present over the past 1 year. -     Transthoracic echocardiogram (TTE) complete with contrast, bubble, strain, and 3D PRN; Future    Elevated brain natriuretic peptide (BNP) level:Check an echo to check LV fx. May be due to unrecognized AF.  -     Transthoracic echocardiogram (TTE) complete with contrast, bubble, strain, and 3D PRN; Future    Persistent atrial fibrillation (HCC):New onset. Unknown duration. Add Toprol for rate control. Add Eliquis for [address] [place] [address] Road and stroke risk reduction. Echo to assess LA size,valve anatomy,and LV fx. Check a 7 day holter to assess AF burden and rate control.  -     Transthoracic echocardiogram (TTE) complete with contrast, bubble, strain, and 3D PRN; Future  -     Extended cardiac holter monitor (48 hrs - 15 days); Future    Other orders  -     apixaban (ELIQUIS) 5 MG TABS tablet; Take 1 tablet by mouth 2 times daily  -     metoprolol succinate (TOPROL XL) 25 MG extended release tablet; Take 1 tablet by mouth daily      Disposition:  Return for Follow up after Echo, Follow up after Med change, Follow up after procedure. Thank you for allowing me to participate in this patient's care. Please call or contact me if there are any questions or concerns regarding the above.       Dana Spain MD  03/15/23  5:17 PM

## 2023-03-24 ENCOUNTER — HOSPITAL ENCOUNTER (OUTPATIENT)
Dept: MAMMOGRAPHY | Age: 68
Discharge: HOME | End: 2023-03-24
Payer: COMMERCIAL

## 2023-03-24 ENCOUNTER — HOSPITAL ENCOUNTER (OUTPATIENT)
Dept: MAMMOGRAPHY | Age: 68
End: 2023-03-24
Payer: COMMERCIAL

## 2023-03-24 DIAGNOSIS — Z12.31 SCREENING MAMMOGRAM FOR HIGH-RISK PATIENT: ICD-10-CM

## 2023-03-24 DIAGNOSIS — Z78.0 POSTMENOPAUSAL: ICD-10-CM

## 2023-03-24 PROCEDURE — 77067 SCR MAMMO BI INCL CAD: CPT

## 2023-03-24 PROCEDURE — 77080 DXA BONE DENSITY AXIAL: CPT

## 2023-03-30 PROBLEM — Z12.11 COLON CANCER SCREENING: Status: RESOLVED | Noted: 2023-02-28 | Resolved: 2023-03-30

## 2023-04-19 ENCOUNTER — HOSPITAL ENCOUNTER (OUTPATIENT)
Dept: MAMMOGRAPHY | Age: 68
Discharge: HOME | End: 2023-04-22
Payer: COMMERCIAL

## 2023-04-19 DIAGNOSIS — R92.8 ABNORMAL SCREENING MAMMOGRAM: ICD-10-CM

## 2023-04-19 PROCEDURE — 77065 DX MAMMO INCL CAD UNI: CPT

## 2023-04-19 PROCEDURE — G0279 TOMOSYNTHESIS, MAMMO: HCPCS

## 2023-04-19 PROCEDURE — 76642 ULTRASOUND BREAST LIMITED: CPT

## 2023-04-20 ENCOUNTER — TELEPHONE (OUTPATIENT)
Dept: CARDIOLOGY CLINIC | Age: 68
End: 2023-04-20

## 2023-04-20 NOTE — TELEPHONE ENCOUNTER
Cardiac Clearance        Physician or Practice Requesting:[facility] [id number] [place] Person: [id number] Phone Number: [phone]  Fax Number: [phone]  Date of Surgery/Procedure: 05/04/23  Type of Surgery or Procedure: Breast Biopsy  Type of Anesthesia: Local  Type of Clearance Requested: Cardiac and med  Medication to Hold:Eliquis  Days to Hold: 3 to 5 days

## 2023-04-21 ENCOUNTER — OFFICE VISIT (OUTPATIENT)
Dept: CARDIOLOGY CLINIC | Age: 68
End: 2023-04-21
Payer: COMMERCIAL

## 2023-04-21 VITALS
BODY MASS INDEX: 24.15 KG/M2 | WEIGHT: 168.7 LBS | DIASTOLIC BLOOD PRESSURE: 78 MMHG | HEIGHT: 70 IN | SYSTOLIC BLOOD PRESSURE: 110 MMHG | HEART RATE: 62 BPM

## 2023-04-21 DIAGNOSIS — I48.19 PERSISTENT ATRIAL FIBRILLATION (HCC): Primary | ICD-10-CM

## 2023-04-21 DIAGNOSIS — D68.69 SECONDARY HYPERCOAGULABLE STATE (HCC): ICD-10-CM

## 2023-04-21 PROCEDURE — 1123F ACP DISCUSS/DSCN MKR DOCD: CPT | Performed by: INTERNAL MEDICINE

## 2023-04-21 PROCEDURE — 99213 OFFICE O/P EST LOW 20 MIN: CPT | Performed by: INTERNAL MEDICINE

## 2023-04-21 RX ORDER — METOPROLOL SUCCINATE 25 MG/1
25 TABLET, EXTENDED RELEASE ORAL DAILY
Qty: 30 TABLET | Refills: 5 | Status: SHIPPED | OUTPATIENT
Start: 2023-04-21

## 2023-04-21 ASSESSMENT — ENCOUNTER SYMPTOMS
ABDOMINAL PAIN: 0
DIARRHEA: 0
HEMATOCHEZIA: 0
HEMATEMESIS: 0
SHORTNESS OF BREATH: 0
SPUTUM PRODUCTION: 0
HOARSE VOICE: 0
COLOR CHANGE: 0
BLURRED VISION: 0
WHEEZING: 0
ORTHOPNEA: 0
BOWEL INCONTINENCE: 0

## 2023-04-21 NOTE — TELEPHONE ENCOUNTER
Per Dr. Angi Roblero, [facility] LONG TERM CV risk for breast BX. OK to hold Eliquis for BX but is at risk of a stroke\"

## 2023-04-21 NOTE — PROGRESS NOTES
m/s    TR Peak Gradient 27 mmHg    Body Surface Area 1.95 m2    Fractional Shortening 2D 57 28 - 44 %    LV ESV Index A4C 7 mL/m2    LV EDV Index A4C 23 mL/m2    LV ESV Index A2C 13 mL/m2    LV EDV Index A2C 24 mL/m2    LVIDd Index 1.80 cm/m2    LVIDs Index 0.77 cm/m2    LV RWT Ratio 0.57     LV Mass 2D 103.4 67 - 162 g    LV Mass 2D Index 53.3 43 - 95 g/m2    MV E/A 1.82     E/E' Ratio (Averaged) 7.78     E/E' Lateral 7.29     E/E' Septal 8.27     LA Volume Index BP 36 (A) 16 - 34 ml/m2    LA Volume Index 2C 37 (A) 16 - 34 mL/m2    LA Volume Index 4C 33 16 - 34 mL/m2    LA Size Index 2.32 cm/m2    LA/AO Root Ratio 1.96     Ao Root Index 1.19 cm/m2    AV Velocity Ratio 0.88     RVSP 30 mmHg     Lab Results   Component Value Date/Time    CHOL 179 12/27/2022 11:53 AM    HDL 52 12/27/2022 11:53 AM    VLDL 15 08/26/2021 09:25 AM     No results found for any visits on 04/21/23. ASSESSMENT and PLAN    Persistent atrial fibrillation:  -EP referral.  -Toprol 25 mg daily  -Eliquis 5 mg bid. Disposition:    Return in about 6 months (around 10/21/2023).                 Jeremy Layton MD  4/21/2023  8:49 AM

## 2023-04-24 ENCOUNTER — TELEPHONE (OUTPATIENT)
Dept: MAMMOGRAPHY | Age: 68
End: 2023-04-24

## 2023-05-04 ENCOUNTER — HOSPITAL ENCOUNTER (OUTPATIENT)
Dept: MAMMOGRAPHY | Age: 68
Discharge: HOME | End: 2023-05-04
Payer: COMMERCIAL

## 2023-05-04 ENCOUNTER — HOSPITAL ENCOUNTER (OUTPATIENT)
Dept: MAMMOGRAPHY | Age: 68
End: 2023-05-04
Payer: COMMERCIAL

## 2023-05-04 VITALS — HEART RATE: 92 BPM | SYSTOLIC BLOOD PRESSURE: 121 MMHG | DIASTOLIC BLOOD PRESSURE: 71 MMHG

## 2023-05-04 DIAGNOSIS — R92.8 ABNORMAL ULTRASOUND OF BREAST: ICD-10-CM

## 2023-05-04 DIAGNOSIS — N63.10 MASS OF RIGHT BREAST, UNSPECIFIED QUADRANT: ICD-10-CM

## 2023-05-04 PROCEDURE — 88305 TISSUE EXAM BY PATHOLOGIST: CPT

## 2023-05-04 PROCEDURE — 2500000003 HC RX 250 WO HCPCS: Performed by: FAMILY MEDICINE

## 2023-05-04 PROCEDURE — 2709999900 US BREAST BIOPSY W LOC DEVICE 1ST LESION RIGHT

## 2023-05-04 PROCEDURE — 77065 DX MAMMO INCL CAD UNI: CPT

## 2023-05-04 RX ORDER — LIDOCAINE HYDROCHLORIDE 10 MG/ML
5 INJECTION, SOLUTION INFILTRATION; PERINEURAL ONCE
Status: COMPLETED | OUTPATIENT
Start: 2023-05-04 | End: 2023-05-04

## 2023-05-04 RX ADMIN — LIDOCAINE HYDROCHLORIDE 5 ML: 10 INJECTION, SOLUTION INFILTRATION; PERINEURAL at 09:04

## 2023-05-04 ASSESSMENT — PAIN SCALES - GENERAL: PAINLEVEL_OUTOF10: 2

## 2023-05-05 ENCOUNTER — TELEPHONE (OUTPATIENT)
Dept: MAMMOGRAPHY | Age: 68
End: 2023-05-05

## 2023-05-05 NOTE — TELEPHONE ENCOUNTER
Sw/ pt regarding biopsy results. Biopsy came back as benign (normal). Explained to pt that radiologist is recommending she come back in 6 months for Right diagnostic mammogram with possible ultrasound. Pt understood and agreed.

## 2023-06-02 ENCOUNTER — INITIAL CONSULT (OUTPATIENT)
Age: 68
End: 2023-06-02
Payer: COMMERCIAL

## 2023-06-02 VITALS
WEIGHT: 170 LBS | SYSTOLIC BLOOD PRESSURE: 130 MMHG | HEIGHT: 68 IN | BODY MASS INDEX: 25.76 KG/M2 | HEART RATE: 108 BPM | DIASTOLIC BLOOD PRESSURE: 88 MMHG

## 2023-06-02 DIAGNOSIS — R79.89 ELEVATED BRAIN NATRIURETIC PEPTIDE (BNP) LEVEL: ICD-10-CM

## 2023-06-02 DIAGNOSIS — I48.19 PERSISTENT ATRIAL FIBRILLATION (HCC): Primary | ICD-10-CM

## 2023-06-02 DIAGNOSIS — I87.332 CHRONIC VENOUS HYPERTENSION WITH ULCER AND INFLAMMATION INVOLVING LEFT SIDE (HCC): ICD-10-CM

## 2023-06-02 DIAGNOSIS — D68.69 SECONDARY HYPERCOAGULABLE STATE (HCC): ICD-10-CM

## 2023-06-02 PROCEDURE — 93000 ELECTROCARDIOGRAM COMPLETE: CPT | Performed by: INTERNAL MEDICINE

## 2023-06-02 PROCEDURE — 99245 OFF/OP CONSLTJ NEW/EST HI 55: CPT | Performed by: INTERNAL MEDICINE

## 2023-06-02 ASSESSMENT — ENCOUNTER SYMPTOMS
GASTROINTESTINAL NEGATIVE: 1
EYES NEGATIVE: 1
SHORTNESS OF BREATH: 1
ALLERGIC/IMMUNOLOGIC NEGATIVE: 1

## 2023-06-02 NOTE — PROGRESS NOTES
[facility] CARDIOLOGY  [address] [place] [address], [address] [facility], [address] [place] [address]  PHONE: [phone]        23      NAME:  Cyndy Ramos  : 1955  MRN: [id number]     Referring Cardiologist: Freddy Jaquez MD    Reason for Consultation: Atrial fibrillation     ASSESSMENT and PLAN:  Glo Toussaint was seen today for consultation and irregular heart beat. Diagnoses and all orders for this visit:    Persistent atrial fibrillation (HCC)    Chronic venous hypertension with ulcer and inflammation involving left side (HCC)    Elevated brain natriuretic peptide (BNP) level    Secondary hypercoagulable state ([facility] [address]    74year old female with persistent AF here to discuss her diagnosis. We reviewed treatment options and she has opted for the AF ablation. She has severe symptoms. She's had family members with AF that affected them and she desires to fix this issue if she is able. Her risks to the procedure include use of [address]. -AF - continue current medicines. Ensure taking Eliquis 5 mg BID. Continue BB.    -Routine cardiac care per Dr. Fabián Linder. -EP follow up as scheduled or PRN. Procedure to be performed: AF ablation  Device/ablation Company: [employer]  Procedure Date: TBD  Medications to hold, days to hold ([address], AAD): Eliquis, night before, day of procedure. Anesthesia: GA   DIXIE required?: Y    AF ABLATION EDUCATION (done today):  I discussed with the patient the pathophysiology of atrial fibrillation, including details about potential triggers from the pulmonary veins (PVs), as well as non-PV sites. We also discussed that in certain patients (especially those with more persistent AF) there is often atrial substrate (i.e. fibrosis, dilatation, etc.) that promotes more sustained AF.  We also discussed the therapeutic options for treatment of atrial fibrillation, including:  --Rate control   --Rhythm control with antiarrhythmic drugs (AAD) and supplemental rate control  --Catheter ablation, including pulmonary vein

## 2023-07-03 ENCOUNTER — PREP FOR PROCEDURE (OUTPATIENT)
Dept: SURGERY | Age: 68
End: 2023-07-03

## 2023-07-05 DIAGNOSIS — I48.19 PERSISTENT ATRIAL FIBRILLATION (HCC): ICD-10-CM

## 2023-07-05 LAB
ANION GAP SERPL CALC-SCNC: 7 MMOL/L (ref 2–11)
BUN SERPL-MCNC: 13 MG/DL (ref 8–23)
CALCIUM SERPL-MCNC: 9.6 MG/DL (ref 8.3–10.4)
CHLORIDE SERPL-SCNC: 107 MMOL/L (ref 101–110)
CO2 SERPL-SCNC: 25 MMOL/L (ref 21–32)
CREAT SERPL-MCNC: 0.7 MG/DL (ref 0.6–1)
ERYTHROCYTE [DISTWIDTH] IN BLOOD BY AUTOMATED COUNT: 12.2 % (ref 11.9–14.6)
GLUCOSE SERPL-MCNC: 95 MG/DL (ref 65–100)
HCT VFR BLD AUTO: 46.6 % (ref 35.8–46.3)
HGB BLD-MCNC: 15.1 G/DL (ref 11.7–15.4)
INR PPP: 1.4
MCH RBC QN AUTO: 31.5 PG (ref 26.1–32.9)
MCHC RBC AUTO-ENTMCNC: 32.4 G/DL (ref 31.4–35)
MCV RBC AUTO: 97.1 FL (ref 82–102)
NRBC # BLD: 0 K/UL (ref 0–0.2)
PLATELET # BLD AUTO: 277 K/UL (ref 150–450)
PMV BLD AUTO: 12.1 FL (ref 9.4–12.3)
POTASSIUM SERPL-SCNC: 4.4 MMOL/L (ref 3.5–5.1)
PROTHROMBIN TIME: 17.3 SEC (ref 12.6–14.3)
RBC # BLD AUTO: 4.8 M/UL (ref 4.05–5.2)
SODIUM SERPL-SCNC: 139 MMOL/L (ref 133–143)
WBC # BLD AUTO: 6.1 K/UL (ref 4.3–11.1)

## 2023-07-05 RX ORDER — SODIUM CHLORIDE 0.9 % (FLUSH) 0.9 %
5-40 SYRINGE (ML) INJECTION EVERY 12 HOURS SCHEDULED
Status: CANCELLED | OUTPATIENT
Start: 2023-07-05

## 2023-07-05 RX ORDER — SODIUM CHLORIDE 9 MG/ML
INJECTION, SOLUTION INTRAVENOUS PRN
Status: CANCELLED | OUTPATIENT
Start: 2023-07-05

## 2023-07-05 RX ORDER — SODIUM CHLORIDE 0.9 % (FLUSH) 0.9 %
5-40 SYRINGE (ML) INJECTION PRN
Status: CANCELLED | OUTPATIENT
Start: 2023-07-05

## 2023-07-10 ENCOUNTER — TELEPHONE (OUTPATIENT)
Age: 68
End: 2023-07-10

## 2023-07-10 NOTE — TELEPHONE ENCOUNTER
----- Message from Quinton Scott MA sent at 7/10/2023  8:00 AM EDT -----  Regarding: FW: Question regarding PROTIME-INR  Contact: [phone]    ----- Message -----  From: Umang Chambers"  Sent: 7/7/2023   8:04 PM EDT  To: [facility] Card[facility] Clinical Staff  Subject: Question regarding PROTIME-INR                   What is being tested? What do test results mean?

## 2023-07-10 NOTE — TELEPHONE ENCOUNTER
----- Message from Vicenta Snyder MA sent at 7/10/2023  8:00 AM EDT -----  Regarding: FW: Question regarding PROTIME-INR  Contact: [phone]    ----- Message -----  From: Monique Hogan"  Sent: 7/7/2023   8:04 PM EDT  To: [facility]el[facility] Cardiology Clinical Staff  Subject: Question regarding PROTIME-INR                   What is being tested? What do test results mean?

## 2023-07-10 NOTE — TELEPHONE ENCOUNTER
Spoke to pt made her aware blood work that was done is routine prior to procedures. She stated she will address her concerns directly with the doctor prior to her procedure as some indications seem to be out of range.

## 2023-07-11 ENCOUNTER — ANESTHESIA EVENT (OUTPATIENT)
Dept: CARDIAC CATH/INVASIVE PROCEDURES | Age: 68
End: 2023-07-11
Payer: COMMERCIAL

## 2023-07-11 RX ORDER — SODIUM CHLORIDE, SODIUM LACTATE, POTASSIUM CHLORIDE, CALCIUM CHLORIDE 600; 310; 30; 20 MG/100ML; MG/100ML; MG/100ML; MG/100ML
INJECTION, SOLUTION INTRAVENOUS CONTINUOUS
Status: CANCELLED | OUTPATIENT
Start: 2023-07-11

## 2023-07-11 RX ORDER — MIDAZOLAM HYDROCHLORIDE 2 MG/2ML
2 INJECTION, SOLUTION INTRAMUSCULAR; INTRAVENOUS
Status: CANCELLED | OUTPATIENT
Start: 2023-07-11 | End: 2023-07-12

## 2023-07-11 RX ORDER — SODIUM CHLORIDE 0.9 % (FLUSH) 0.9 %
5-40 SYRINGE (ML) INJECTION PRN
Status: CANCELLED | OUTPATIENT
Start: 2023-07-11

## 2023-07-11 RX ORDER — SODIUM CHLORIDE 9 MG/ML
INJECTION, SOLUTION INTRAVENOUS PRN
Status: CANCELLED | OUTPATIENT
Start: 2023-07-11

## 2023-07-11 RX ORDER — SODIUM CHLORIDE 0.9 % (FLUSH) 0.9 %
5-40 SYRINGE (ML) INJECTION EVERY 12 HOURS SCHEDULED
Status: CANCELLED | OUTPATIENT
Start: 2023-07-11

## 2023-07-11 RX ORDER — LIDOCAINE HYDROCHLORIDE 10 MG/ML
1 INJECTION, SOLUTION INFILTRATION; PERINEURAL
Status: CANCELLED | OUTPATIENT
Start: 2023-07-11 | End: 2023-07-12

## 2023-07-11 NOTE — PROGRESS NOTES
Patient pre-assessment complete for Atrial fib ablation with Dr Nakul Pena scheduled for 23, arrival time 8:30am. Patient verified using . Patient instructed to bring a list of all home medications on the day of procedure. NPO status reinforced. Patient instructed to [address] in am. Instructed they can take all other medications excluding vitamins & supplements. Patient verbalizes understanding of all instructions & denies any questions at this time.

## 2023-07-12 ENCOUNTER — HOSPITAL ENCOUNTER (OUTPATIENT)
Age: 68
Discharge: HOME | End: 2023-07-13
Attending: INTERNAL MEDICINE | Admitting: INTERNAL MEDICINE
Payer: COMMERCIAL

## 2023-07-12 ENCOUNTER — APPOINTMENT (OUTPATIENT)
Dept: CARDIAC CATH/INVASIVE PROCEDURES | Age: 68
End: 2023-07-12
Attending: INTERNAL MEDICINE
Payer: COMMERCIAL

## 2023-07-12 ENCOUNTER — ANESTHESIA (OUTPATIENT)
Dept: CARDIAC CATH/INVASIVE PROCEDURES | Age: 68
End: 2023-07-12
Payer: COMMERCIAL

## 2023-07-12 DIAGNOSIS — I48.91 ATRIAL FIBRILLATION WITH TACHYCARDIC VENTRICULAR RATE (HCC): ICD-10-CM

## 2023-07-12 DIAGNOSIS — I48.91 A-FIB (HCC): ICD-10-CM

## 2023-07-12 PROBLEM — Z86.79 S/P ABLATION OF ATRIAL FIBRILLATION: Status: ACTIVE | Noted: 2023-07-12

## 2023-07-12 PROBLEM — Z98.890 S/P ABLATION OF ATRIAL FIBRILLATION: Status: ACTIVE | Noted: 2023-07-12

## 2023-07-12 LAB
ACT BLD: 323 SECS (ref 70–128)
ECHO BSA: 1.92 M2
ECHO BSA: 1.92 M2
EKG ATRIAL RATE: 102 BPM
EKG ATRIAL RATE: 315 BPM
EKG DIAGNOSIS: NORMAL
EKG DIAGNOSIS: NORMAL
EKG P AXIS: 88 DEGREES
EKG P-R INTERVAL: 130 MS
EKG Q-T INTERVAL: 360 MS
EKG Q-T INTERVAL: 362 MS
EKG QRS DURATION: 64 MS
EKG QRS DURATION: 80 MS
EKG QTC CALCULATION (BAZETT): 471 MS
EKG QTC CALCULATION (BAZETT): 489 MS
EKG R AXIS: 118 DEGREES
EKG R AXIS: 120 DEGREES
EKG T AXIS: 37 DEGREES
EKG T AXIS: 66 DEGREES
EKG VENTRICULAR RATE: 102 BPM
EKG VENTRICULAR RATE: 111 BPM
MAGNESIUM SERPL-MCNC: 2.3 MG/DL (ref 1.8–2.4)

## 2023-07-12 PROCEDURE — 93320 DOPPLER ECHO COMPLETE: CPT | Performed by: INTERNAL MEDICINE

## 2023-07-12 PROCEDURE — 93010 ELECTROCARDIOGRAM REPORT: CPT | Performed by: INTERNAL MEDICINE

## 2023-07-12 PROCEDURE — 93657 TX L/R ATRIAL FIB ADDL: CPT | Performed by: INTERNAL MEDICINE

## 2023-07-12 PROCEDURE — 2580000003 HC RX 258: Performed by: NURSE ANESTHETIST, CERTIFIED REGISTERED

## 2023-07-12 PROCEDURE — 6360000002 HC RX W HCPCS: Performed by: INTERNAL MEDICINE

## 2023-07-12 PROCEDURE — 2709999900 HC NON-CHARGEABLE SUPPLY: Performed by: INTERNAL MEDICINE

## 2023-07-12 PROCEDURE — 3700000000 HC ANESTHESIA ATTENDED CARE: Performed by: INTERNAL MEDICINE

## 2023-07-12 PROCEDURE — 85347 COAGULATION TIME ACTIVATED: CPT

## 2023-07-12 PROCEDURE — 7100000000 HC PACU RECOVERY - FIRST 15 MIN: Performed by: INTERNAL MEDICINE

## 2023-07-12 PROCEDURE — 93005 ELECTROCARDIOGRAM TRACING: CPT | Performed by: INTERNAL MEDICINE

## 2023-07-12 PROCEDURE — 93325 DOPPLER ECHO COLOR FLOW MAPG: CPT

## 2023-07-12 PROCEDURE — 6370000000 HC RX 637 (ALT 250 FOR IP): Performed by: INTERNAL MEDICINE

## 2023-07-12 PROCEDURE — 93656 COMPRE EP EVAL ABLTJ ATR FIB: CPT | Performed by: INTERNAL MEDICINE

## 2023-07-12 PROCEDURE — 6360000002 HC RX W HCPCS: Performed by: NURSE ANESTHETIST, CERTIFIED REGISTERED

## 2023-07-12 PROCEDURE — 2720000010 HC SURG SUPPLY STERILE: Performed by: INTERNAL MEDICINE

## 2023-07-12 PROCEDURE — 92960 CARDIOVERSION ELECTRIC EXT: CPT | Performed by: INTERNAL MEDICINE

## 2023-07-12 PROCEDURE — C1730 CATH, EP, 19 OR FEW ELECT: HCPCS | Performed by: INTERNAL MEDICINE

## 2023-07-12 PROCEDURE — 2580000003 HC RX 258: Performed by: INTERNAL MEDICINE

## 2023-07-12 PROCEDURE — 93623 PRGRMD STIMJ&PACG IV RX NFS: CPT | Performed by: INTERNAL MEDICINE

## 2023-07-12 PROCEDURE — 83735 ASSAY OF MAGNESIUM: CPT

## 2023-07-12 PROCEDURE — C1766 INTRO/SHEATH,STRBLE,NON-PEEL: HCPCS | Performed by: INTERNAL MEDICINE

## 2023-07-12 PROCEDURE — 93312 ECHO TRANSESOPHAGEAL: CPT | Performed by: INTERNAL MEDICINE

## 2023-07-12 PROCEDURE — C1732 CATH, EP, DIAG/ABL, 3D/VECT: HCPCS | Performed by: INTERNAL MEDICINE

## 2023-07-12 PROCEDURE — 93622 COMP EP EVAL L VENTR PAC&REC: CPT | Performed by: INTERNAL MEDICINE

## 2023-07-12 PROCEDURE — C9399 UNCLASSIFIED DRUGS OR BIOLOG: HCPCS | Performed by: NURSE ANESTHETIST, CERTIFIED REGISTERED

## 2023-07-12 PROCEDURE — C1760 CLOSURE DEV, VASC: HCPCS | Performed by: INTERNAL MEDICINE

## 2023-07-12 PROCEDURE — C1893 INTRO/SHEATH, FIXED,NON-PEEL: HCPCS | Performed by: INTERNAL MEDICINE

## 2023-07-12 PROCEDURE — 93655 ICAR CATH ABLTJ DSCRT ARRHYT: CPT | Performed by: INTERNAL MEDICINE

## 2023-07-12 PROCEDURE — 93325 DOPPLER ECHO COLOR FLOW MAPG: CPT | Performed by: INTERNAL MEDICINE

## 2023-07-12 PROCEDURE — 2500000003 HC RX 250 WO HCPCS: Performed by: NURSE ANESTHETIST, CERTIFIED REGISTERED

## 2023-07-12 PROCEDURE — C1759 CATH, INTRA ECHOCARDIOGRAPHY: HCPCS | Performed by: INTERNAL MEDICINE

## 2023-07-12 PROCEDURE — 7100000001 HC PACU RECOVERY - ADDTL 15 MIN: Performed by: INTERNAL MEDICINE

## 2023-07-12 PROCEDURE — C1894 INTRO/SHEATH, NON-LASER: HCPCS | Performed by: INTERNAL MEDICINE

## 2023-07-12 PROCEDURE — 3700000001 HC ADD 15 MINUTES (ANESTHESIA): Performed by: INTERNAL MEDICINE

## 2023-07-12 RX ORDER — SODIUM CHLORIDE 9 MG/ML
INJECTION, SOLUTION INTRAVENOUS PRN
Status: DISCONTINUED | OUTPATIENT
Start: 2023-07-12 | End: 2023-07-12 | Stop reason: HOSPADM

## 2023-07-12 RX ORDER — DEXTROSE MONOHYDRATE 100 MG/ML
INJECTION, SOLUTION INTRAVENOUS CONTINUOUS PRN
Status: DISCONTINUED | OUTPATIENT
Start: 2023-07-12 | End: 2023-07-12 | Stop reason: HOSPADM

## 2023-07-12 RX ORDER — SODIUM CHLORIDE 0.9 % (FLUSH) 0.9 %
5-40 SYRINGE (ML) INJECTION PRN
Status: DISCONTINUED | OUTPATIENT
Start: 2023-07-12 | End: 2023-07-12 | Stop reason: HOSPADM

## 2023-07-12 RX ORDER — COLCHICINE 0.6 MG/1
0.6 TABLET ORAL DAILY PRN
Status: DISCONTINUED | OUTPATIENT
Start: 2023-07-12 | End: 2023-07-13 | Stop reason: HOSPADM

## 2023-07-12 RX ORDER — ROCURONIUM BROMIDE 10 MG/ML
INJECTION, SOLUTION INTRAVENOUS PRN
Status: DISCONTINUED | OUTPATIENT
Start: 2023-07-12 | End: 2023-07-12 | Stop reason: SDUPTHER

## 2023-07-12 RX ORDER — SUCRALFATE 1 G/1
1 TABLET ORAL 4 TIMES DAILY
Qty: 120 TABLET | Refills: 0 | Status: SHIPPED | OUTPATIENT
Start: 2023-07-12 | End: 2023-08-11

## 2023-07-12 RX ORDER — HEPARIN SODIUM 1000 [USP'U]/ML
INJECTION, SOLUTION INTRAVENOUS; SUBCUTANEOUS PRN
Status: DISCONTINUED | OUTPATIENT
Start: 2023-07-12 | End: 2023-07-12 | Stop reason: SDUPTHER

## 2023-07-12 RX ORDER — SODIUM CHLORIDE, SODIUM LACTATE, POTASSIUM CHLORIDE, CALCIUM CHLORIDE 600; 310; 30; 20 MG/100ML; MG/100ML; MG/100ML; MG/100ML
INJECTION, SOLUTION INTRAVENOUS CONTINUOUS
Status: DISCONTINUED | OUTPATIENT
Start: 2023-07-12 | End: 2023-07-13 | Stop reason: HOSPADM

## 2023-07-12 RX ORDER — COLCHICINE 0.6 MG/1
0.6 TABLET ORAL 2 TIMES DAILY PRN
Qty: 20 TABLET | Refills: 0 | Status: SHIPPED | OUTPATIENT
Start: 2023-07-12 | End: 2023-07-22

## 2023-07-12 RX ORDER — PANTOPRAZOLE SODIUM 40 MG/1
40 TABLET, DELAYED RELEASE ORAL 2 TIMES DAILY
Qty: 60 TABLET | Refills: 0 | Status: SHIPPED | OUTPATIENT
Start: 2023-07-12

## 2023-07-12 RX ORDER — HALOPERIDOL 5 MG/ML
1 INJECTION INTRAMUSCULAR
Status: DISCONTINUED | OUTPATIENT
Start: 2023-07-12 | End: 2023-07-12 | Stop reason: HOSPADM

## 2023-07-12 RX ORDER — ONDANSETRON 2 MG/ML
4 INJECTION INTRAMUSCULAR; INTRAVENOUS
Status: DISCONTINUED | OUTPATIENT
Start: 2023-07-12 | End: 2023-07-12 | Stop reason: HOSPADM

## 2023-07-12 RX ORDER — PROTAMINE SULFATE 10 MG/ML
INJECTION, SOLUTION INTRAVENOUS PRN
Status: DISCONTINUED | OUTPATIENT
Start: 2023-07-12 | End: 2023-07-12 | Stop reason: SDUPTHER

## 2023-07-12 RX ORDER — PANTOPRAZOLE SODIUM 40 MG/1
40 TABLET, DELAYED RELEASE ORAL
Status: DISCONTINUED | OUTPATIENT
Start: 2023-07-12 | End: 2023-07-13 | Stop reason: HOSPADM

## 2023-07-12 RX ORDER — ACETAMINOPHEN 325 MG/1
650 TABLET ORAL EVERY 4 HOURS PRN
Status: DISCONTINUED | OUTPATIENT
Start: 2023-07-12 | End: 2023-07-13 | Stop reason: HOSPADM

## 2023-07-12 RX ORDER — ONDANSETRON 2 MG/ML
INJECTION INTRAMUSCULAR; INTRAVENOUS PRN
Status: DISCONTINUED | OUTPATIENT
Start: 2023-07-12 | End: 2023-07-12 | Stop reason: SDUPTHER

## 2023-07-12 RX ORDER — OXYCODONE HYDROCHLORIDE 5 MG/1
5 TABLET ORAL
Status: DISCONTINUED | OUTPATIENT
Start: 2023-07-12 | End: 2023-07-12 | Stop reason: HOSPADM

## 2023-07-12 RX ORDER — SODIUM CHLORIDE 0.9 % (FLUSH) 0.9 %
5-40 SYRINGE (ML) INJECTION EVERY 12 HOURS SCHEDULED
Status: DISCONTINUED | OUTPATIENT
Start: 2023-07-12 | End: 2023-07-12 | Stop reason: HOSPADM

## 2023-07-12 RX ORDER — FLECAINIDE ACETATE 50 MG/1
50 TABLET ORAL 2 TIMES DAILY
Status: DISCONTINUED | OUTPATIENT
Start: 2023-07-12 | End: 2023-07-13 | Stop reason: HOSPADM

## 2023-07-12 RX ORDER — PROPOFOL 10 MG/ML
INJECTION, EMULSION INTRAVENOUS PRN
Status: DISCONTINUED | OUTPATIENT
Start: 2023-07-12 | End: 2023-07-12 | Stop reason: SDUPTHER

## 2023-07-12 RX ORDER — ZOLPIDEM TARTRATE 5 MG/1
5 TABLET ORAL NIGHTLY PRN
Status: DISCONTINUED | OUTPATIENT
Start: 2023-07-12 | End: 2023-07-13 | Stop reason: HOSPADM

## 2023-07-12 RX ORDER — DEXAMETHASONE SODIUM PHOSPHATE 4 MG/ML
INJECTION, SOLUTION INTRA-ARTICULAR; INTRALESIONAL; INTRAMUSCULAR; INTRAVENOUS; SOFT TISSUE PRN
Status: DISCONTINUED | OUTPATIENT
Start: 2023-07-12 | End: 2023-07-12 | Stop reason: SDUPTHER

## 2023-07-12 RX ORDER — KETOROLAC TROMETHAMINE 30 MG/ML
INJECTION, SOLUTION INTRAMUSCULAR; INTRAVENOUS PRN
Status: DISCONTINUED | OUTPATIENT
Start: 2023-07-12 | End: 2023-07-12 | Stop reason: SDUPTHER

## 2023-07-12 RX ORDER — METOPROLOL SUCCINATE 25 MG/1
25 TABLET, EXTENDED RELEASE ORAL
Status: DISCONTINUED | OUTPATIENT
Start: 2023-07-12 | End: 2023-07-13

## 2023-07-12 RX ORDER — ADENOSINE 3 MG/ML
INJECTION, SOLUTION INTRAVENOUS PRN
Status: DISCONTINUED | OUTPATIENT
Start: 2023-07-12 | End: 2023-07-12 | Stop reason: HOSPADM

## 2023-07-12 RX ORDER — OXYCODONE HYDROCHLORIDE 5 MG/1
5 TABLET ORAL EVERY 4 HOURS PRN
Status: DISCONTINUED | OUTPATIENT
Start: 2023-07-12 | End: 2023-07-13 | Stop reason: HOSPADM

## 2023-07-12 RX ORDER — HYDROMORPHONE HYDROCHLORIDE 2 MG/ML
0.25 INJECTION, SOLUTION INTRAMUSCULAR; INTRAVENOUS; SUBCUTANEOUS EVERY 5 MIN PRN
Status: DISCONTINUED | OUTPATIENT
Start: 2023-07-12 | End: 2023-07-12 | Stop reason: HOSPADM

## 2023-07-12 RX ORDER — SUCRALFATE 1 G/1
1 TABLET ORAL
Status: DISCONTINUED | OUTPATIENT
Start: 2023-07-12 | End: 2023-07-13 | Stop reason: HOSPADM

## 2023-07-12 RX ORDER — LIDOCAINE HYDROCHLORIDE 20 MG/ML
INJECTION, SOLUTION EPIDURAL; INFILTRATION; INTRACAUDAL; PERINEURAL PRN
Status: DISCONTINUED | OUTPATIENT
Start: 2023-07-12 | End: 2023-07-12 | Stop reason: SDUPTHER

## 2023-07-12 RX ORDER — HEPARIN SODIUM AND DEXTROSE 5000; 5 [USP'U]/100ML; G/100ML
INJECTION INTRAVENOUS CONTINUOUS PRN
Status: DISCONTINUED | OUTPATIENT
Start: 2023-07-12 | End: 2023-07-12 | Stop reason: SDUPTHER

## 2023-07-12 RX ADMIN — PHENYLEPHRINE HYDROCHLORIDE 100 MCG: 10 INJECTION INTRAVENOUS at 10:55

## 2023-07-12 RX ADMIN — LIDOCAINE HYDROCHLORIDE 80 MG: 20 INJECTION, SOLUTION EPIDURAL; INFILTRATION; INTRACAUDAL; PERINEURAL at 10:37

## 2023-07-12 RX ADMIN — PANTOPRAZOLE SODIUM 40 MG: 40 TABLET, DELAYED RELEASE ORAL at 18:38

## 2023-07-12 RX ADMIN — PHENYLEPHRINE HYDROCHLORIDE 100 MCG: 10 INJECTION INTRAVENOUS at 10:46

## 2023-07-12 RX ADMIN — PHENYLEPHRINE HYDROCHLORIDE 100 MCG: 10 INJECTION INTRAVENOUS at 10:50

## 2023-07-12 RX ADMIN — KETOROLAC TROMETHAMINE 30 MG: 30 INJECTION, SOLUTION INTRAMUSCULAR at 12:51

## 2023-07-12 RX ADMIN — HEPARIN SODIUM 6500 UNITS: 1000 INJECTION INTRAVENOUS; SUBCUTANEOUS at 11:21

## 2023-07-12 RX ADMIN — ONDANSETRON 4 MG: 2 INJECTION INTRAMUSCULAR; INTRAVENOUS at 10:53

## 2023-07-12 RX ADMIN — SUGAMMADEX 200 MG: 100 INJECTION, SOLUTION INTRAVENOUS at 12:52

## 2023-07-12 RX ADMIN — HEPARIN SODIUM 6500 UNITS: 1000 INJECTION INTRAVENOUS; SUBCUTANEOUS at 11:41

## 2023-07-12 RX ADMIN — PHENYLEPHRINE HYDROCHLORIDE 200 MCG: 10 INJECTION INTRAVENOUS at 12:08

## 2023-07-12 RX ADMIN — APIXABAN 5 MG: 5 TABLET, FILM COATED ORAL at 20:11

## 2023-07-12 RX ADMIN — PHENYLEPHRINE HYDROCHLORIDE 200 MCG: 10 INJECTION INTRAVENOUS at 11:55

## 2023-07-12 RX ADMIN — SODIUM CHLORIDE, SODIUM LACTATE, POTASSIUM CHLORIDE, AND CALCIUM CHLORIDE: 600; 310; 30; 20 INJECTION, SOLUTION INTRAVENOUS at 10:19

## 2023-07-12 RX ADMIN — DEXAMETHASONE SODIUM PHOSPHATE 4 MG: 4 INJECTION, SOLUTION INTRAMUSCULAR; INTRAVENOUS at 10:53

## 2023-07-12 RX ADMIN — SUCRALFATE 1 G: 1 TABLET ORAL at 20:11

## 2023-07-12 RX ADMIN — ROCURONIUM BROMIDE 50 MG: 10 INJECTION, SOLUTION INTRAVENOUS at 10:39

## 2023-07-12 RX ADMIN — HEPARIN SODIUM 40 UNITS/KG/HR: 5000 INJECTION, SOLUTION INTRAVENOUS at 11:41

## 2023-07-12 RX ADMIN — PHENYLEPHRINE HYDROCHLORIDE 200 MCG: 10 INJECTION INTRAVENOUS at 11:42

## 2023-07-12 RX ADMIN — FLECAINIDE ACETATE 50 MG: 50 TABLET ORAL at 16:11

## 2023-07-12 RX ADMIN — PHENYLEPHRINE HYDROCHLORIDE 200 MCG: 10 INJECTION INTRAVENOUS at 11:48

## 2023-07-12 RX ADMIN — PROTAMINE SULFATE 100 MG: 10 INJECTION, SOLUTION INTRAVENOUS at 12:50

## 2023-07-12 RX ADMIN — METOPROLOL SUCCINATE 25 MG: 25 TABLET, EXTENDED RELEASE ORAL at 16:23

## 2023-07-12 RX ADMIN — PHENYLEPHRINE HYDROCHLORIDE 100 MCG: 10 INJECTION INTRAVENOUS at 10:39

## 2023-07-12 RX ADMIN — PHENYLEPHRINE HYDROCHLORIDE 200 MCG: 10 INJECTION INTRAVENOUS at 11:06

## 2023-07-12 RX ADMIN — ROCURONIUM BROMIDE 20 MG: 10 INJECTION, SOLUTION INTRAVENOUS at 11:50

## 2023-07-12 RX ADMIN — PROPOFOL 200 MG: 10 INJECTION, EMULSION INTRAVENOUS at 10:38

## 2023-07-12 RX ADMIN — PHENYLEPHRINE HYDROCHLORIDE 200 MCG: 10 INJECTION INTRAVENOUS at 12:03

## 2023-07-12 ASSESSMENT — PAIN DESCRIPTION - PAIN TYPE
TYPE: SURGICAL PAIN

## 2023-07-12 ASSESSMENT — PAIN DESCRIPTION - LOCATION
LOCATION: GROIN

## 2023-07-12 ASSESSMENT — PAIN SCALES - GENERAL
PAINLEVEL_OUTOF10: 4
PAINLEVEL_OUTOF10: 0

## 2023-07-12 ASSESSMENT — PAIN DESCRIPTION - DESCRIPTORS
DESCRIPTORS: DISCOMFORT
DESCRIPTORS: ACHING

## 2023-07-12 ASSESSMENT — PAIN DESCRIPTION - ORIENTATION
ORIENTATION: LEFT
ORIENTATION: LEFT
ORIENTATION: RIGHT
ORIENTATION: RIGHT;LEFT

## 2023-07-12 NOTE — ANESTHESIA POSTPROCEDURE EVALUATION
Department of Anesthesiology  Postprocedure Note    Patient: Neymar Pope  MRN: [id number]  YOB: 1955  Date of evaluation: 7/12/2023      Procedure Summary     Date: 07/12/23 Room / Location: D EP 2 ALL EVENTS / [facility] CARDIAC CATH LAB    Anesthesia Start: 1019 Anesthesia Stop: 3673    Procedure: Ablation A-fib w complete ep study Diagnosis:       Atrial fibrillation with tachycardic ventricular rate (HCC)      (Atrial fibrillation with tachycardic ventricular rate ([address]) [I48.91])    Providers: Saira Burks MD Responsible Provider: Arthur Canela MD    Anesthesia Type: General ASA Status: 3          Anesthesia Type: General    Cheryl Phase I: Cheryl Score: 8    Cheryl Phase II:        Anesthesia Post Evaluation    Patient location during evaluation: PACU  Patient participation: complete - patient participated  Level of consciousness: awake and alert  Airway patency: patent  Nausea: well controlled. Complications: no  Cardiovascular status: acceptable.   Respiratory status: acceptable  Hydration status: stable

## 2023-07-12 NOTE — PROCEDURES
: Kylie Reilly. Luci Lowery MD    REFERRING: Jake Delgado MD    Pre-Electrophysiology Diagnosis  1. Atrial fibrillation, persistnet  2. Atrial flutter     Procedure Performed  1. EPS afib ablation/pulmonary vein isolation  2. Left atrial pacing recording from the coronary sinus. 3. 3-D Electroanatomical mapping  4. Intracardiac echo  5. Ablation of second arrhythmia x 2  6. LV pacing and recording  7. Transesophageal echo  8. Drug infusion  9. Cardioversion    Anesthesia: General     Estimated Blood Loss: Less than 50 cc     Specimens: * No specimens in log *    Antibiotics: None    Complications: None    Fluoroscopy Time: 0 minutes     Procedure in Detail:  The patient was brought to the electrophysiology lab in the fasting state. The patient was intubated by anesthesiology and an esophageal temperature probe inserted and advanced to a location directly posterior to the LA at the level of the pulmonary veins. The esophageal temperature probe was repositioned throughout the case to a location as close the the ablation catheter as possible. If the esophageal temperature increased 0.5 degrees Celsius ablation was stopped and high flush performed until the temperature returned to baseline. A tranesophageal echocardiogram was performed directly prior to the procedure and was negative for a VALERY thrombus (see full report in chart). A Ref-Star CARTO patch was placed, the patient was then prepped and draped in sterile fashion. Venous access was obtained under ultrasound guidance x4 using modified Seldinger technique, with placement of three 8 Fr short sidearm sheaths into the right femoral vein and placement of a 10 Fr long sheath into the left femoral vein. Two of the 8 Fr sheaths were upsized to an 8.5 Fr CadiaGuide (Biosense-Cosme) and Vizigo (Biosense-Cosme) sheaths, respectively. The patient presented to the EP lab in atrial fibrillation.  A synchronized cardioversion at 300 J was performed with return

## 2023-07-12 NOTE — ANESTHESIA PROCEDURE NOTES
Airway  Date/Time: 7/12/2023 10:42 AM  Urgency: elective    Airway not difficult    General Information and Staff    Patient location during procedure: OR  Resident/CRNA: ERWIN Diaz - CRNA  Performed: resident/CRNA     Indications and Patient Condition  Indications for airway management: anesthesia  Spontaneous ventilation: present  Sedation level: deep  Preoxygenated: yes  Patient position: sniffing  MILS maintained throughout  Mask difficulty assessment: vent by bag mask    Final Airway Details  Final airway type: endotracheal airway      Successful airway: ETT  Cuffed: yes   Successful intubation technique: direct laryngoscopy  Endotracheal tube insertion site: oral  Blade: Ponce  Blade size: #3  ETT size (mm): 7.0  Cormack-Lehane Classification: grade IIa - partial view of glottis  Placement verified by: chest auscultation and capnometry   Cuff volume (mL): 8  Measured from: lips  ETT to lips (cm): 21  Number of attempts at approach: 1  Ventilation between attempts: bag mask    no

## 2023-07-12 NOTE — ANESTHESIA PRE PROCEDURE
(+) blood dyscrasia: anticoagulation therapy:., .                 Abdominal:             Vascular: Other Findings:           Anesthesia Plan      general     ASA 3       Induction: intravenous. MIPS: Postoperative opioids intended. Anesthetic plan and risks discussed with patient. Use of blood products discussed with patient whom consented to blood products.                      Barbara Thakkar MD   7/12/2023

## 2023-07-12 NOTE — PLAN OF CARE
Problem: Pain  Goal: Verbalizes/displays adequate comfort level or baseline comfort level  Outcome: Progressing  Flowsheets (Taken 7/12/2023 1758)  Verbalizes/displays adequate comfort level or baseline comfort level:   Encourage patient to monitor pain and request assistance   Administer analgesics based on type and severity of pain and evaluate response   Assess pain using appropriate pain scale   Implement non-pharmacological measures as appropriate and evaluate response   Consider cultural and social influences on pain and pain management   Notify Licensed Independent Practitioner if interventions unsuccessful or patient reports new pain     Problem: Discharge Planning  Goal: Discharge to home or other facility with appropriate resources  Outcome: Progressing  Flowsheets (Taken 7/12/2023 1758)  Discharge to home or other facility with appropriate resources: Identify barriers to discharge with patient and caregiver     Problem: Safety - Adult  Goal: Free from fall injury  Outcome: Progressing     Problem: Skin/Tissue Integrity  Goal: Absence of new skin breakdown  Description: 1. Monitor for areas of redness and/or skin breakdown  2. Assess vascular access sites hourly  3. Every 4-6 hours minimum:  Change oxygen saturation probe site  4. Every 4-6 hours:  If on nasal continuous positive airway pressure, respiratory therapy assess nares and determine need for appliance change or resting period.   Outcome: Progressing

## 2023-07-12 NOTE — PROGRESS NOTES
Pt's HR increased from 80's to 120-130. EKG done and Dr. Sinan Araiza notified. MD at bedside assessing pt and orders received to cancel discharge and admit overnight.

## 2023-07-12 NOTE — PROGRESS NOTES
TRANSFER - OUT REPORT:    Verbal report given to Aldo Blackmon on Gwendalyn Her  being transferred to [phone] for routine progression of patient care       Report consisted of patient's Situation, Background, Assessment and   Recommendations(SBAR). Information from the following report(s) Nurse Handoff Report, MAR, and Cardiac Rhythm AFlutter  was reviewed with the receiving nurse. Lines:   Peripheral IV 07/12/23 Left Antecubital (Active)   Site Assessment Clean, dry & intact 07/12/23 1310   Line Status Normal saline locked 07/12/23 1310   Line Care Connections checked and tightened 07/12/23 1310   Phlebitis Assessment No symptoms 07/12/23 1310   Infiltration Assessment 0 07/12/23 1310   Dressing Status Clean, dry & intact 07/12/23 1310   Dressing Type Transparent 07/12/23 1310       Peripheral IV 07/12/23 Right Antecubital (Active)   Site Assessment Clean, dry & intact 07/12/23 1310   Line Status Normal saline locked 07/12/23 [address] Connections checked and tightened 07/12/23 1310   Phlebitis Assessment No symptoms 07/12/23 1310   Infiltration Assessment 0 07/12/23 1310   Dressing Status Clean, dry & intact 07/12/23 1310   Dressing Type Transparent 07/12/23 1310        Opportunity for questions and clarification was provided.       Patient transported with:  Monitor and Registered Nurse

## 2023-07-12 NOTE — PROGRESS NOTES
Patient received to [facility] room # 9  Ambulatory from [facility]. Patient scheduled for Afib ablation today with Dr Aron Peterson. Procedure reviewed & questions answered, voiced good understanding consent obtained & placed on chart. All medications and medical history reviewed. Will prep patient per orders. Patient & family updated on plan of care. The patient has a fraility score of 3-MANAGING WELL, based on ambulation.

## 2023-07-12 NOTE — H&P
The patient has been examined and the previous clinic note dated, 2023, has been reviewed and changes have been noted below. 74year old female with persistent AF here for AF ablation. She has undergone informed consent and will proceed with planned procedure under GA. Ramiro Corona. Linda Mckoy MD, [address] [facility]  Clinical Cardiac Electrophysiology  [facility] Cardiology     NAME:  Jairo Jimenez  : 1955  MRN: [id number]      Referring Cardiologist: Ritika Oswald MD     Reason for Consultation: Atrial fibrillation      ASSESSMENT and PLAN:  Hilario Herndon was seen today for consultation and irregular heart beat. Diagnoses and all orders for this visit:     Persistent atrial fibrillation (HCC)     Chronic venous hypertension with ulcer and inflammation involving left side (HCC)     Elevated brain natriuretic peptide (BNP) level     Secondary hypercoagulable state ([address])     74year old female with persistent AF here to discuss her diagnosis. We reviewed treatment options and she has opted for the AF ablation. She has severe symptoms. She's had family members with AF that affected them and she desires to fix this issue if she is able. Her risks to the procedure include use of [address] -AF - continue current medicines. Ensure taking Eliquis 5 mg BID. Continue BB.     -Routine cardiac care per Dr. Christiano Michelle. -EP follow up as scheduled or PRN. Procedure to be performed: AF ablation  Device/ablation Company: [employer]  Procedure Date: TBD  Medications to hold, days to hold ([address], AAD): Eliquis, night before, day of procedure. Anesthesia: GA   DIXIE required?: Y     AF ABLATION EDUCATION (done today):  I discussed with the patient the pathophysiology of atrial fibrillation, including details about potential triggers from the pulmonary veins (PVs), as well as non-PV sites.   We also discussed that in certain patients (especially those with more persistent AF) there is often atrial substrate (i.e. fibrosis, dilatation, etc.) that

## 2023-07-12 NOTE — PROGRESS NOTES
TRANSFER - IN REPORT:    Verbal report received from PACU on Yonathan Sandy  being received from PACU for routine progression of patient care      Report consisted of patient's Situation, Background, Assessment and   Recommendations(SBAR). Information from the following report(s) Nurse Handoff Report and Cardiac Rhythm ST  was reviewed with the receiving nurse. Opportunity for questions and clarification was provided. Assessment completed upon patient's arrival to unit and care assumed.

## 2023-07-13 ENCOUNTER — TELEPHONE (OUTPATIENT)
Age: 68
End: 2023-07-13

## 2023-07-13 ENCOUNTER — HOSPITAL ENCOUNTER (OUTPATIENT)
Dept: CARDIAC CATH/INVASIVE PROCEDURES | Age: 68
Discharge: HOME | End: 2023-07-15
Attending: INTERNAL MEDICINE
Payer: COMMERCIAL

## 2023-07-13 VITALS
DIASTOLIC BLOOD PRESSURE: 65 MMHG | TEMPERATURE: 97.5 F | OXYGEN SATURATION: 97 % | SYSTOLIC BLOOD PRESSURE: 106 MMHG | HEART RATE: 97 BPM | RESPIRATION RATE: 19 BRPM | BODY MASS INDEX: 32.82 KG/M2 | WEIGHT: 209.1 LBS | HEIGHT: 67 IN

## 2023-07-13 VITALS — OXYGEN SATURATION: 99 % | DIASTOLIC BLOOD PRESSURE: 72 MMHG | SYSTOLIC BLOOD PRESSURE: 105 MMHG | HEART RATE: 98 BPM

## 2023-07-13 PROBLEM — I48.92 ATRIAL FLUTTER (HCC): Status: RESOLVED | Noted: 2023-07-13 | Resolved: 2023-07-13

## 2023-07-13 PROBLEM — I48.92 ATRIAL FLUTTER (HCC): Status: ACTIVE | Noted: 2023-07-13

## 2023-07-13 PROBLEM — I48.19 PERSISTENT ATRIAL FIBRILLATION (HCC): Status: RESOLVED | Noted: 2023-03-15 | Resolved: 2023-07-13

## 2023-07-13 LAB
ANION GAP SERPL CALC-SCNC: 7 MMOL/L (ref 2–11)
BUN SERPL-MCNC: 17 MG/DL (ref 8–23)
CALCIUM SERPL-MCNC: 8.6 MG/DL (ref 8.3–10.4)
CHLORIDE SERPL-SCNC: 114 MMOL/L (ref 101–110)
CO2 SERPL-SCNC: 21 MMOL/L (ref 21–32)
CREAT SERPL-MCNC: 0.7 MG/DL (ref 0.6–1)
ECHO BSA: 1.93 M2
EKG ATRIAL RATE: 155 BPM
EKG DIAGNOSIS: NORMAL
EKG P AXIS: 34 DEGREES
EKG P-R INTERVAL: 356 MS
EKG Q-T INTERVAL: 142 MS
EKG QRS DURATION: 146 MS
EKG QTC CALCULATION (BAZETT): 228 MS
EKG R AXIS: 268 DEGREES
EKG T AXIS: 0 DEGREES
EKG VENTRICULAR RATE: 155 BPM
ERYTHROCYTE [DISTWIDTH] IN BLOOD BY AUTOMATED COUNT: 12.3 % (ref 11.9–14.6)
GLUCOSE SERPL-MCNC: 121 MG/DL (ref 65–100)
HCT VFR BLD AUTO: 39.1 % (ref 35.8–46.3)
HGB BLD-MCNC: 12.9 G/DL (ref 11.7–15.4)
MCH RBC QN AUTO: 31.9 PG (ref 26.1–32.9)
MCHC RBC AUTO-ENTMCNC: 33 G/DL (ref 31.4–35)
MCV RBC AUTO: 96.8 FL (ref 82–102)
NRBC # BLD: 0 K/UL (ref 0–0.2)
PLATELET # BLD AUTO: 239 K/UL (ref 150–450)
PMV BLD AUTO: 10.9 FL (ref 9.4–12.3)
POTASSIUM SERPL-SCNC: 4.6 MMOL/L (ref 3.5–5.1)
RBC # BLD AUTO: 4.04 M/UL (ref 4.05–5.2)
SODIUM SERPL-SCNC: 142 MMOL/L (ref 133–143)
WBC # BLD AUTO: 8.6 K/UL (ref 4.3–11.1)

## 2023-07-13 PROCEDURE — 93010 ELECTROCARDIOGRAM REPORT: CPT | Performed by: INTERNAL MEDICINE

## 2023-07-13 PROCEDURE — 6360000002 HC RX W HCPCS: Performed by: INTERNAL MEDICINE

## 2023-07-13 PROCEDURE — 6370000000 HC RX 637 (ALT 250 FOR IP): Performed by: INTERNAL MEDICINE

## 2023-07-13 PROCEDURE — 80048 BASIC METABOLIC PNL TOTAL CA: CPT

## 2023-07-13 PROCEDURE — 92960 CARDIOVERSION ELECTRIC EXT: CPT

## 2023-07-13 PROCEDURE — 85027 COMPLETE CBC AUTOMATED: CPT

## 2023-07-13 PROCEDURE — 36415 COLL VENOUS BLD VENIPUNCTURE: CPT

## 2023-07-13 PROCEDURE — 93005 ELECTROCARDIOGRAM TRACING: CPT | Performed by: INTERNAL MEDICINE

## 2023-07-13 RX ORDER — METOPROLOL SUCCINATE 25 MG/1
25 TABLET, EXTENDED RELEASE ORAL 2 TIMES DAILY
Status: DISCONTINUED | OUTPATIENT
Start: 2023-07-13 | End: 2023-07-13 | Stop reason: HOSPADM

## 2023-07-13 RX ORDER — FLECAINIDE ACETATE 50 MG/1
50 TABLET ORAL 2 TIMES DAILY
Qty: 60 TABLET | Refills: 3 | Status: SHIPPED | OUTPATIENT
Start: 2023-07-13

## 2023-07-13 RX ORDER — FENTANYL CITRATE 50 UG/ML
INJECTION, SOLUTION INTRAMUSCULAR; INTRAVENOUS PRN
Status: COMPLETED | OUTPATIENT
Start: 2023-07-13 | End: 2023-07-13

## 2023-07-13 RX ORDER — MIDAZOLAM HYDROCHLORIDE 1 MG/ML
INJECTION INTRAMUSCULAR; INTRAVENOUS PRN
Status: COMPLETED | OUTPATIENT
Start: 2023-07-13 | End: 2023-07-13

## 2023-07-13 RX ADMIN — APIXABAN 5 MG: 5 TABLET, FILM COATED ORAL at 08:31

## 2023-07-13 RX ADMIN — FLECAINIDE ACETATE 50 MG: 50 TABLET ORAL at 08:31

## 2023-07-13 RX ADMIN — MIDAZOLAM 2 MG: 1 INJECTION INTRAMUSCULAR; INTRAVENOUS at 10:21

## 2023-07-13 RX ADMIN — PANTOPRAZOLE SODIUM 40 MG: 40 TABLET, DELAYED RELEASE ORAL at 05:52

## 2023-07-13 RX ADMIN — FENTANYL CITRATE 25 MCG: 50 INJECTION, SOLUTION INTRAMUSCULAR; INTRAVENOUS at 10:21

## 2023-07-13 RX ADMIN — SUCRALFATE 1 G: 1 TABLET ORAL at 12:50

## 2023-07-13 RX ADMIN — METOPROLOL SUCCINATE 25 MG: 25 TABLET, EXTENDED RELEASE ORAL at 08:31

## 2023-07-13 RX ADMIN — SUCRALFATE 1 G: 1 TABLET ORAL at 05:52

## 2023-07-13 ASSESSMENT — PAIN SCALES - GENERAL
PAINLEVEL_OUTOF10: 0

## 2023-07-13 NOTE — TELEPHONE ENCOUNTER
Pharmacy needs pre auth for Protonix before they can fill it. [facility] pharmacy on file.    She was just discharged from the hospital.

## 2023-07-13 NOTE — PROCEDURES
: Ramiro Corona. Linda Mckoy MD    REFERRING: Ritika Oswald MD    Pre-Procedure Diagnosis  1. Atrial fibrillation, persistent      Procedure Performed  1. Direct Current Cardioversion  2. Physician directed moderate sedation      ASA: II    Mallampati: II    Anesthesia: Moderate Sedation    Complications: None    Procedural Description: The patient was brought to the operating suite in a fasting, nonsedated state. The risks, benefits and alternatives of the procedure were reviewed with the patient, and final questions answered. Informed consent was confirmed. A procedural timeout was called and completed per institutional policy. Once appropriate monitors were applied, procedural moderate sedation was provided in divided dose of Fentanyl and Versed by Ban Gomez RN, under direct physician supervision. A total of 25 mcg of Fentanyl and 2 mg of Versed was given between 10:20 and 10:34 AM. Once an appropriate level of sedation was achieved, the patient was converted to sinus rhythm with pads across the anterior and posterior chest wall with a synchronized  J shock. The patient awoke from the procedure without overt complications. IMPRESSION: Normal sinus rhythm. PLAN:   -Routine cardiac care and EP follow up.   -Continue medical therapy. Ramiro Mckoy MD, [address] [facility]  Clinical Cardiac Electrophysiology  [facility]    7/13/2023  10:51 AM

## 2023-07-13 NOTE — TELEPHONE ENCOUNTER
Spoke to pt and pharmacy. Pts insurance will only cover 1 tab daily. Pt to use GoodRx coupon instead of insurance and obtain all 60 tablets.

## 2023-07-13 NOTE — PLAN OF CARE
Problem: Pain  Goal: Verbalizes/displays adequate comfort level or baseline comfort level  7/12/2023 2230 by Daren Saul RN  Outcome: Progressing  7/12/2023 1835 by Lizbeth Dee RN  Outcome: Progressing  Flowsheets (Taken 7/12/2023 1758)  Verbalizes/displays adequate comfort level or baseline comfort level:   Encourage patient to monitor pain and request assistance   Administer analgesics based on type and severity of pain and evaluate response   Assess pain using appropriate pain scale   Implement non-pharmacological measures as appropriate and evaluate response   Consider cultural and social influences on pain and pain management   Notify Licensed Independent Practitioner if interventions unsuccessful or patient reports new pain     Problem: Discharge Planning  Goal: Discharge to home or other facility with appropriate resources  7/12/2023 2230 by Daren Saul RN  Outcome: Progressing  7/12/2023 1835 by Lizbeth Dee RN  Outcome: Progressing  Flowsheets (Taken 7/12/2023 1758)  Discharge to home or other facility with appropriate resources: Identify barriers to discharge with patient and caregiver     Problem: Safety - Adult  Goal: Free from fall injury  7/12/2023 2230 by Daren Saul RN  Outcome: Progressing  7/12/2023 1835 by Lizbeth Dee RN  Outcome: Progressing     Problem: Skin/Tissue Integrity  Goal: Absence of new skin breakdown  Description: 1. Monitor for areas of redness and/or skin breakdown  2. Assess vascular access sites hourly  3. Every 4-6 hours minimum:  Change oxygen saturation probe site  4. Every 4-6 hours:  If on nasal continuous positive airway pressure, respiratory therapy assess nares and determine need for appliance change or resting period.   7/12/2023 2230 by Daren Saul RN  Outcome: Progressing  7/12/2023 1835 by Lizbeth Dee RN  Outcome: Progressing

## 2023-07-13 NOTE — PLAN OF CARE
Problem: Pain  Goal: Verbalizes/displays adequate comfort level or baseline comfort level  7/13/2023 1143 by Denis Quezada RN  Outcome: Adequate for Discharge  Flowsheets (Taken 7/13/2023 0831 by Priya Loera RN)  Verbalizes/displays adequate comfort level or baseline comfort level:   Encourage patient to monitor pain and request assistance   Assess pain using appropriate pain scale   Administer analgesics based on type and severity of pain and evaluate response   Implement non-pharmacological measures as appropriate and evaluate response   Consider cultural and social influences on pain and pain management   Notify Licensed Independent Practitioner if interventions unsuccessful or patient reports new pain  7/12/2023 2230 by Carie Lunsford RN  Outcome: Progressing     Problem: Discharge Planning  Goal: Discharge to home or other facility with appropriate resources  7/13/2023 1143 by Denis Quezada RN  Outcome: Adequate for Discharge  7/12/2023 2230 by Carie Lunsford RN  Outcome: Progressing     Problem: Safety - Adult  Goal: Free from fall injury  7/13/2023 1143 by Denis Quezada RN  Outcome: Adequate for Discharge  Flowsheets (Taken 7/13/2023 0834 by Priya Loera RN)  Free From Fall Injury: Instruct family/caregiver on patient safety  7/12/2023 2230 by Carie Lunsford RN  Outcome: Progressing     Problem: Skin/Tissue Integrity  Goal: Absence of new skin breakdown  Description: 1. Monitor for areas of redness and/or skin breakdown  2. Assess vascular access sites hourly  3. Every 4-6 hours minimum:  Change oxygen saturation probe site  4. Every 4-6 hours:  If on nasal continuous positive airway pressure, respiratory therapy assess nares and determine need for appliance change or resting period.   7/13/2023 1143 by Denis Quezada RN  Outcome: Adequate for Discharge  7/12/2023 2230 by Carie Lunsford RN  Outcome: Progressing

## 2023-07-13 NOTE — PROGRESS NOTES
Spiritual Care Visit,     Advance Directive Consult attempted. Patient had been discharged. Visit by Kimmy Evans, Staff .  RUBY.Vaughn., Ingrid.DRAKE., B.A.

## 2023-07-13 NOTE — PROGRESS NOTES
Discharge instructions were reviewed with patient. An opportunity was given for questions. All medications were reviewed, and information was given on the new medications. Patient verbalized understanding, and has no questions at this time. IVs and heart monitor removed.

## 2023-07-13 NOTE — DISCHARGE SUMMARY
[facility] Discharge Summary     Patient ID:  Savannah Platt  [id number]  74 y.o.  1955    Admit date: 7/12/2023    Discharge date:  7/13/23    Admitting Physician: Daniel Gomez MD     Discharge Physician: ERWIN Parker - NESTOR/Dr. Margy Olvera    Admission Diagnoses: Atrial fibrillation with tachycardic ventricular rate ([address]) [I48.91]  S/P ablation of atrial fibrillation [Z98.890, Z86.79]    Discharge Diagnoses:   Patient Active Problem List    Diagnosis    S/P ablation of atrial fibrillation    Elevated brain natriuretic peptide (BNP) level    GARNER (dyspnea on exertion)    Persistent atrial fibrillation (HCC)    Secondary hypercoagulable state ([address])    Non-pressure chronic ulcer of left ankle with fat layer exposed ([address])    Chronic venous hypertension with ulcer and inflammation involving left side [facility])       Cardiology Procedures this admission:  A fib/flutter ablation   Consults: None    Hospital Course: Patient was seen at the office of [facility] by Dr. Margy Olvera for management of persistent a fib and was subsequently scheduled for an AM Admission ablation at [facility] on 7/12/23. Patient underwent ablation by Dr. Margy Olvera. Patient tolerated the procedure well and was taken to [facility] for recovery. Echocardiogram was done and showed:       Left Ventricle: Normal left ventricular systolic function with a visually estimated EF of 55 - 60%. Left ventricle size is normal. Normal wall thickness. Normal wall motion. Mitral Valve: Mild regurgitation. Left Atrium: Left atrium is moderately dilated. No left atrial appendage thrombus noted. Right Atrium: Right atrium is mildly dilated. The next morning the patient was in a flutter. She was taken down for a CVN by Dr Efren Perla with successful conversion to NSR after 1 attempt at [address]     The morning of discharge, patient was up feeling well without any complaints of chest pain or

## 2023-07-13 NOTE — CARE COORDINATION
Patient admitted in OP status for scheduled EP study /ablation. Pt underwent the procedure 7/12 with successful eCV. Aflutter overnight. Patient returned to the lab and underwent eCV this AM which was successful. Discharge order placed. CM attempted to meet patient in pt room for assessment of discharge needs but pt still in CV lab. CM spoke with female family member at bedside. No CM needs identified.

## 2023-07-18 ENCOUNTER — TELEPHONE (OUTPATIENT)
Age: 68
End: 2023-07-18

## 2023-07-18 NOTE — TELEPHONE ENCOUNTER
Spoke with pt made her aware per Dr Sinan Araiza Ideally you're in rhythm and not dealing with being OOR. I'd come in for an ECG just to see what you're in. We may need to consider changing to Tikosyn. Pt stated she will wait until her follow up on 8/4/23 and re evaluate her symptoms at that time.

## 2023-07-31 ENCOUNTER — TELEPHONE (OUTPATIENT)
Dept: SURGERY | Age: 68
End: 2023-07-31

## 2023-08-04 ENCOUNTER — OFFICE VISIT (OUTPATIENT)
Age: 68
End: 2023-08-04

## 2023-08-04 VITALS
BODY MASS INDEX: 26.37 KG/M2 | SYSTOLIC BLOOD PRESSURE: 126 MMHG | WEIGHT: 168 LBS | HEIGHT: 67 IN | HEART RATE: 89 BPM | DIASTOLIC BLOOD PRESSURE: 80 MMHG

## 2023-08-04 DIAGNOSIS — Z09 HOSPITAL DISCHARGE FOLLOW-UP: ICD-10-CM

## 2023-08-04 DIAGNOSIS — I48.19 PERSISTENT ATRIAL FIBRILLATION (HCC): Primary | ICD-10-CM

## 2023-08-04 ASSESSMENT — ENCOUNTER SYMPTOMS
ALLERGIC/IMMUNOLOGIC NEGATIVE: 1
SHORTNESS OF BREATH: 1
EYES NEGATIVE: 1
GASTROINTESTINAL NEGATIVE: 1

## 2023-08-28 ENCOUNTER — PATIENT MESSAGE (OUTPATIENT)
Age: 68
End: 2023-08-28

## 2023-08-29 ENCOUNTER — NURSE ONLY (OUTPATIENT)
Age: 68
End: 2023-08-29
Payer: COMMERCIAL

## 2023-08-29 DIAGNOSIS — Z86.79 S/P ABLATION OF ATRIAL FIBRILLATION: Primary | ICD-10-CM

## 2023-08-29 DIAGNOSIS — Z98.890 S/P ABLATION OF ATRIAL FIBRILLATION: Primary | ICD-10-CM

## 2023-08-29 PROCEDURE — 93000 ELECTROCARDIOGRAM COMPLETE: CPT | Performed by: INTERNAL MEDICINE

## 2023-08-29 NOTE — PROGRESS NOTES
Pt in office for EKG. States Apple Watch alerted her yesterday to elevated HR (see pt's My Chart message). Meds include Eliquis 5mg 1 BID and Metoprolol Suc  25mg QD. Ablation July 2023. EKG preformed. HR- 111   BP-126/82. Pt states feels fine. Dr. Shelley Dee reviewed  EKG. Per Dr. Abi Hager,  would like HR to be in the 90's. Increase Metoprolol Succ to 50mg QD for 1 week. After 1 week if heart rate remains over 100 increase Metoprolol Succ 50mg to 1 tab BID. If pt needs new RX., call our office. Pt was informed of Dr Kiana Wolff response. Pt v/u. Pt will continue to monitor HR.

## 2023-09-14 NOTE — PROGRESS NOTES
PROGRESS NOTE    SUBJECTIVE:   Ryan Mejias is a 74 y.o. female seen for a follow up visit regarding the following chief complaint:     Chief Complaint   Patient presents with    Discuss Labs           HPI  Patient is here to go over lab results that she had done for a life insurance policy    Past Medical History, Past Surgical History, Family history, Social History, and Medications were all reviewed with the patient today and updated as necessary. Current Outpatient Medications   Medication Sig Dispense Refill    Multiple Vitamins-Minerals (MULTIVITAMIN ADULT, MINERALS, PO) Take 1 tablet by mouth daily      ascorbic acid (VITAMIN C) 500 MG tablet Take by mouth daily       No current facility-administered medications for this visit. No Known Allergies  Patient Active Problem List   Diagnosis    Non-pressure chronic ulcer of left ankle with fat layer exposed ([address]    Chronic venous hypertension with ulcer and inflammation involving left side ([address]     No past medical history on file. Past Surgical History:   Procedure Laterality Date    BREAST SURGERY      Cyst Removal    GYN       Family History   Problem Relation Age of Onset    Heart Disease Father     Heart Disease Mother      Social History     Tobacco Use    Smoking status: Never     Passive exposure: Never    Smokeless tobacco: Never   Substance Use Topics    Alcohol use: Yes         Review of Systems   Constitutional:  Negative for fatigue and fever. Respiratory:  Negative for shortness of breath. Cardiovascular:  Negative for chest pain. Gastrointestinal:  Negative for nausea and vomiting. OBJECTIVE:  /84 (Site: Left Upper Arm, Position: Sitting, Cuff Size: Small Adult)   Ht 5' 10\" (1.778 m)   Wt 169 lb (76.7 kg)   BMI 24.25 kg/m²      Physical Exam  Vitals and nursing note reviewed. Constitutional:       Appearance: Normal appearance. Eyes:      Pupils: Pupils are equal, round, and reactive to light.    Cardiovascular: Rate and Rhythm: Normal rate and regular rhythm. Pulmonary:      Effort: Pulmonary effort is normal.      Breath sounds: Normal breath sounds. Neurological:      Mental Status: She is alert. Psychiatric:         Mood and Affect: Mood normal.         Behavior: Behavior normal.        Medical problems and test results were reviewed with the patient today. No results found for this or any previous visit (from the past 672 hour(s)). ASSESSMENT and PLAN    Visit Diagnoses and Associated Orders       Elevated brain natriuretic peptide (BNP) level    -  Primary    Brain Natriuretic Peptide [[id number] Custom]   - Future Order         ORDERS WITHOUT AN ASSOCIATED DIAGNOSIS    Multiple Vitamins-Minerals (MULTIVITAMIN ADULT, MINERALS, PO) [[id number]]                  Diagnosis Orders   1. Elevated brain natriuretic peptide (BNP) level  Brain Natriuretic Peptide       Nolvia Jc was seen today for discuss labs. Diagnoses and all orders for this visit:    Elevated brain natriuretic peptide (BNP) level  -     Brain Natriuretic Peptide;  Future    , Recommended repeating her labs she is scheduled for a physical next week most of the ALT and AST are on her LAD for her standard physical so we will repeat those the only thing that we do not do on a physical is the BN P which she had a pro NT BNP that was elevated suggesting congestive heart failure which she does have so we will repeat these labs and discuss we will further work-up pending those results when she returns back next week Hospitalist

## 2023-09-25 RX ORDER — METOPROLOL SUCCINATE 50 MG/1
50 TABLET, EXTENDED RELEASE ORAL DAILY
Qty: 30 TABLET | Refills: 5 | Status: SHIPPED | OUTPATIENT
Start: 2023-09-25

## 2023-09-25 NOTE — TELEPHONE ENCOUNTER
Requested Prescriptions     Pending Prescriptions Disp Refills    metoprolol succinate (TOPROL XL) 50 MG extended release tablet 30 tablet 5     Sig: Take 1 tablet by mouth daily      Per chart note from [facility] on 8/29/23 due to pt high heart rate increase Metoprolol to 50mg QD =. Pt needed a script as she has been taking 2 25mg Tabs QD.

## 2023-11-16 ENCOUNTER — OFFICE VISIT (OUTPATIENT)
Age: 68
End: 2023-11-16

## 2023-11-16 VITALS
HEIGHT: 67 IN | HEART RATE: 100 BPM | SYSTOLIC BLOOD PRESSURE: 124 MMHG | BODY MASS INDEX: 26.68 KG/M2 | WEIGHT: 170 LBS | DIASTOLIC BLOOD PRESSURE: 76 MMHG

## 2023-11-16 DIAGNOSIS — Z86.79 S/P ABLATION OF ATRIAL FIBRILLATION: Primary | ICD-10-CM

## 2023-11-16 DIAGNOSIS — Z98.890 S/P ABLATION OF ATRIAL FIBRILLATION: Primary | ICD-10-CM

## 2023-11-16 ASSESSMENT — ENCOUNTER SYMPTOMS
SPUTUM PRODUCTION: 0
ABDOMINAL PAIN: 0
ORTHOPNEA: 0
HEMATEMESIS: 0
VOMITING: 0
DIARRHEA: 0
NAUSEA: 0
COLOR CHANGE: 0
WHEEZING: 0
HEMATOCHEZIA: 0
COUGH: 0
HOARSE VOICE: 0
BLURRED VISION: 0
BOWEL INCONTINENCE: 0
SHORTNESS OF BREATH: 0

## 2023-11-16 NOTE — PROGRESS NOTES
tablet by mouth daily, Disp: , Rfl:     ascorbic acid (VITAMIN C) 500 MG tablet, Take by mouth daily, Disp: , Rfl:     flecainide (TAMBOCOR) 50 MG tablet, Take 1 tablet by mouth 2 times daily (Patient not taking: Reported on 11/16/2023), Disp: 60 tablet, Rfl: 3    sucralfate (CARAFATE) 1 GM tablet, Take 1 tablet by mouth 4 times daily (Patient not taking: Reported on 11/16/2023), Disp: 120 tablet, Rfl: 0    pantoprazole (PROTONIX) 40 MG tablet, Take 1 tablet by mouth in the morning and at bedtime (Patient not taking: Reported on 11/16/2023), Disp: 60 tablet, Rfl: 0  Allergies   Allergen Reactions    Codeine Itching     History reviewed. No pertinent past medical history. Past Surgical History:   Procedure Laterality Date    BREAST SURGERY      Cyst Removal    EP DEVICE PROCEDURE N/A 7/12/2023    Ablation A-fib w complete ep study performed by Karey Sharp MD at [address] W LOC DEVICE 1ST LESION RIGHT Right 5/4/2023     BREAST BIOPSY W LOC DEVICE 1ST LESION RIGHT 5/4/2023 Daniel Morris MD [facility]     Family History   Problem Relation Age of Onset    Heart Disease Father     Heart Disease Mother       Social History     Tobacco Use    Smoking status: Never     Passive exposure: Past    Smokeless tobacco: Never   Substance Use Topics    Alcohol use: Yes       ROS:    Review of Systems   Constitutional: Negative for chills, decreased appetite, diaphoresis, fever and malaise/fatigue. HENT:  Negative for congestion, hearing loss, hoarse voice and nosebleeds. Eyes:  Negative for blurred vision. Cardiovascular:  Positive for palpitations. Negative for chest pain, claudication, cyanosis, dyspnea on exertion, irregular heartbeat, leg swelling, near-syncope, orthopnea, paroxysmal nocturnal dyspnea and syncope. Respiratory:  Negative for cough, shortness of breath, sputum production and wheezing.     Endocrine: Negative for polydipsia, polyphagia and

## 2023-11-27 ENCOUNTER — TELEPHONE (OUTPATIENT)
Age: 68
End: 2023-11-27

## 2023-11-27 NOTE — TELEPHONE ENCOUNTER
Cardiac Clearance        Physician or Practice Requesting:Dr Dae Henriquez   : Deepak Betito [phone]  Contact Phone Number:   Fax Number: [phone]  Date of Surgery/Procedure: 02/28  Type of Surgery or Procedure: 1 tooth extracted   Type of Anesthesia: local   Type of Clearance Requested: Cardiac Clearance and Medication Hold  Medication to Hold:Eliquis   Days to Hold: whatever  says     Needs to know today

## 2023-11-27 NOTE — TELEPHONE ENCOUNTER
Per Dr. Stephie Wooten, ok to hold eliquis x 48 hrs if dentist deems necessary prior to scaling and root planing.

## 2023-11-27 NOTE — TELEPHONE ENCOUNTER
Made Jose aware this office does not recommend holding eliquis for a single tooth extraction. Pt to have scaling and root plaining done on 12/7, asking if pt needs to hold eliquis for this. Will be having local anesthesia.

## 2023-11-28 ENCOUNTER — TELEPHONE (OUTPATIENT)
Age: 68
End: 2023-11-28

## 2023-11-28 NOTE — TELEPHONE ENCOUNTER
Cardiac Clearance        Physician or Practice Requesting: [facility]  : Dr Rock Itzel DDS  Contact Phone Number: [phone]  Fax Number: [phone]  Date of Surgery/Procedure: TBD  Type of Surgery or Procedure: Tooth Extraction  Type of Anesthesia: na  Type of Clearance Requested: Medication Hold Only  Medication to Hold: Eliquis  Days to Hold: Dr Rodriguez groves

## 2023-11-30 NOTE — TELEPHONE ENCOUNTER
Per Dr. Lizzy Bedoya, \"ok to hold Eliquis for 2-3 days before dental extraction at increased stroke risk\".

## 2023-12-15 ENCOUNTER — OFFICE VISIT (OUTPATIENT)
Age: 68
End: 2023-12-15

## 2023-12-15 VITALS
HEART RATE: 92 BPM | SYSTOLIC BLOOD PRESSURE: 138 MMHG | HEIGHT: 67 IN | WEIGHT: 166 LBS | DIASTOLIC BLOOD PRESSURE: 86 MMHG | BODY MASS INDEX: 26.06 KG/M2

## 2023-12-15 DIAGNOSIS — Z98.890 S/P ABLATION OF ATRIAL FIBRILLATION: Primary | ICD-10-CM

## 2023-12-15 DIAGNOSIS — Z86.79 S/P ABLATION OF ATRIAL FIBRILLATION: Primary | ICD-10-CM

## 2023-12-15 NOTE — PROGRESS NOTES
[facility] CARDIOLOGY  [id number] [place] [address]  PHONE: [phone]        12/15/23        NAME:  Didi Johnson  : 1955  MRN: [id number]       SUBJECTIVE:   Didi Johnson is a 79 y.o. female seen for a follow up visit regarding the following: Recently,the patient was seen for complaints of palpitations. She had AF ablation in 2023. Follow up extended holter monitor revealed NSR with first degree AV block with an average HR of 93 bpm with rare PAC's and PVC's. She returns for follow up discussion of test results. I discussed monitor results with the patient. Overall,she reports feeling well. No chief complaint on file. HPI:    Atrial Fibrillation  Presents for follow-up visit. Symptoms are negative for an AICD problem, bradycardia, chest pain, dizziness, hemodynamic instability, hypertension, hypotension, pacemaker problem, palpitations, shortness of breath, syncope, tachycardia and weakness. Past Medical History, Past Surgical History, Family history, Social History, and Medications were all reviewed with the patient today and updated as necessary.          Current Outpatient Medications:     apixaban (ELIQUIS) 5 MG TABS tablet, Take 1 tablet by mouth 2 times daily, Disp: 180 tablet, Rfl: 3    metoprolol succinate (TOPROL XL) 50 MG extended release tablet, Take 1 tablet by mouth daily, Disp: 30 tablet, Rfl: 5    pantoprazole (PROTONIX) 40 MG tablet, Take 1 tablet by mouth in the morning and at bedtime, Disp: 60 tablet, Rfl: 0    Omega-3 Fatty Acids (FISH OIL) 1000 MG capsule, Take by mouth On occasion, Disp: , Rfl:     LUTEIN PO, Take by mouth, Disp: , Rfl:     Cholecalciferol (VITAMIN D3) 50 MCG (2000) CAPS, Take by mouth, Disp: , Rfl:     Multiple Vitamins-Minerals (MULTIVITAMIN ADULT, MINERALS, PO), Take 1 tablet by mouth daily, Disp: , Rfl:     ascorbic acid (VITAMIN C) 500 MG tablet, Take by mouth daily, Disp: , Rfl:   Allergies   Allergen Reactions    Codeine

## 2024-02-16 ENCOUNTER — OFFICE VISIT (OUTPATIENT)
Dept: FAMILY MEDICINE CLINIC | Facility: CLINIC | Age: 69
End: 2024-02-16
Payer: COMMERCIAL

## 2024-02-16 VITALS
DIASTOLIC BLOOD PRESSURE: 78 MMHG | HEIGHT: 67 IN | WEIGHT: 166 LBS | BODY MASS INDEX: 26.06 KG/M2 | SYSTOLIC BLOOD PRESSURE: 102 MMHG | HEART RATE: 95 BPM

## 2024-02-16 DIAGNOSIS — M54.2 NECK PAIN: Primary | ICD-10-CM

## 2024-02-16 DIAGNOSIS — M25.551 RIGHT HIP PAIN: ICD-10-CM

## 2024-02-16 PROCEDURE — 99213 OFFICE O/P EST LOW 20 MIN: CPT | Performed by: FAMILY MEDICINE

## 2024-02-16 PROCEDURE — 1123F ACP DISCUSS/DSCN MKR DOCD: CPT | Performed by: FAMILY MEDICINE

## 2024-02-16 RX ORDER — NAPROXEN 500 MG/1
500 TABLET ORAL 2 TIMES DAILY PRN
Qty: 60 TABLET | Refills: 0 | Status: SHIPPED | OUTPATIENT
Start: 2024-02-16

## 2024-02-16 RX ORDER — CYCLOBENZAPRINE HCL 10 MG
10 TABLET ORAL NIGHTLY PRN
Qty: 30 TABLET | Refills: 0 | Status: SHIPPED | OUTPATIENT
Start: 2024-02-16

## 2024-02-16 NOTE — PROGRESS NOTES
PROGRESS NOTE    SUBJECTIVE:   Katalina Ardon is a 68 y.o. female seen for a follow up visit regarding the following chief complaint:     Chief Complaint   Patient presents with    Neck Pain    Hip Pain           HPI patient presents office after falling at work tripping on cables ended up going to the urgent care and she states \"they did nothing they did not even look at me\".  Patient's sister states because she is traveling to [place] that she has to be careful she might not be able to fly because she fell?      Past Medical History, Past Surgical History, Family history, Social History, and Medications were all reviewed with the patient today and updated as necessary.       Current Outpatient Medications   Medication Sig Dispense Refill    cyclobenzaprine (FLEXERIL) 10 MG tablet Take 1 tablet by mouth nightly as needed for Muscle spasms 30 tablet 0    naproxen (NAPROSYN) 500 MG tablet Take 1 tablet by mouth 2 times daily as needed for Pain 60 tablet 0    apixaban (ELIQUIS) 5 MG TABS tablet Take 1 tablet by mouth 2 times daily 180 tablet 3    metoprolol succinate (TOPROL XL) 50 MG extended release tablet Take 1 tablet by mouth daily 30 tablet 5    Omega-3 Fatty Acids (FISH OIL) 1000 MG capsule Take by mouth On occasion      LUTEIN PO Take by mouth      Cholecalciferol (VITAMIN D3) 50 MCG (2000 UT) CAPS Take by mouth      Multiple Vitamins-Minerals (MULTIVITAMIN ADULT, MINERALS, PO) Take 1 tablet by mouth daily      ascorbic acid (VITAMIN C) 500 MG tablet Take by mouth daily       No current facility-administered medications for this visit.     Allergies   Allergen Reactions    Codeine Itching     Patient Active Problem List   Diagnosis    Non-pressure chronic ulcer of left ankle with fat layer exposed (HCC)    Chronic venous hypertension with ulcer and inflammation involving left side (HCC)    Elevated brain natriuretic peptide (BNP) level    GARNER (dyspnea on exertion)    Secondary hypercoagulable state (HCC)    S/P

## 2024-03-27 ENCOUNTER — TELEPHONE (OUTPATIENT)
Dept: FAMILY MEDICINE CLINIC | Facility: CLINIC | Age: 69
End: 2024-03-27

## 2024-03-27 DIAGNOSIS — F40.243 FEAR OF FLYING: Primary | ICD-10-CM

## 2024-03-27 RX ORDER — CLONAZEPAM 1 MG/1
TABLET ORAL
Qty: 10 TABLET | Refills: 0 | Status: SHIPPED | OUTPATIENT
Start: 2024-03-27 | End: 2024-04-17

## 2024-03-27 NOTE — TELEPHONE ENCOUNTER
Will call in Klonopin and advised patient to take Dramamine for motion sickness for her trip to [place]

## 2024-04-17 RX ORDER — METOPROLOL SUCCINATE 50 MG/1
50 TABLET, EXTENDED RELEASE ORAL DAILY
Qty: 90 TABLET | Refills: 3 | Status: SHIPPED | OUTPATIENT
Start: 2024-04-17

## 2024-04-17 NOTE — TELEPHONE ENCOUNTER
Requested Prescriptions     Pending Prescriptions Disp Refills    metoprolol succinate (TOPROL XL) 50 MG extended release tablet 90 tablet 3     Sig: Take 1 tablet by mouth daily

## 2024-05-06 ENCOUNTER — HOSPITAL ENCOUNTER (EMERGENCY)
Age: 69
Discharge: HOME | End: 2024-05-07
Attending: EMERGENCY MEDICINE
Payer: COMMERCIAL

## 2024-05-06 DIAGNOSIS — S42.292A HUMERAL HEAD FRACTURE, LEFT, CLOSED, INITIAL ENCOUNTER: Primary | ICD-10-CM

## 2024-05-06 PROCEDURE — 99284 EMERGENCY DEPT VISIT MOD MDM: CPT

## 2024-05-06 ASSESSMENT — PAIN - FUNCTIONAL ASSESSMENT: PAIN_FUNCTIONAL_ASSESSMENT: 0-10

## 2024-05-06 ASSESSMENT — PAIN SCALES - GENERAL: PAINLEVEL_OUTOF10: 8

## 2024-05-07 ENCOUNTER — TELEPHONE (OUTPATIENT)
Dept: ORTHOPEDIC SURGERY | Age: 69
End: 2024-05-07

## 2024-05-07 ENCOUNTER — APPOINTMENT (OUTPATIENT)
Dept: CT IMAGING | Age: 69
End: 2024-05-07
Payer: COMMERCIAL

## 2024-05-07 ENCOUNTER — APPOINTMENT (OUTPATIENT)
Dept: GENERAL RADIOLOGY | Age: 69
End: 2024-05-07
Payer: COMMERCIAL

## 2024-05-07 VITALS
WEIGHT: 169 LBS | SYSTOLIC BLOOD PRESSURE: 104 MMHG | HEART RATE: 88 BPM | DIASTOLIC BLOOD PRESSURE: 69 MMHG | RESPIRATION RATE: 18 BRPM | BODY MASS INDEX: 26.53 KG/M2 | OXYGEN SATURATION: 96 % | TEMPERATURE: 97.7 F | HEIGHT: 67 IN

## 2024-05-07 PROCEDURE — 73030 X-RAY EXAM OF SHOULDER: CPT

## 2024-05-07 PROCEDURE — 71045 X-RAY EXAM CHEST 1 VIEW: CPT

## 2024-05-07 PROCEDURE — 73070 X-RAY EXAM OF ELBOW: CPT

## 2024-05-07 PROCEDURE — 70450 CT HEAD/BRAIN W/O DYE: CPT

## 2024-05-07 PROCEDURE — 6370000000 HC RX 637 (ALT 250 FOR IP): Performed by: EMERGENCY MEDICINE

## 2024-05-07 RX ORDER — ONDANSETRON 4 MG/1
4 TABLET, ORALLY DISINTEGRATING ORAL ONCE
Status: COMPLETED | OUTPATIENT
Start: 2024-05-07 | End: 2024-05-07

## 2024-05-07 RX ORDER — HYDROCODONE BITARTRATE AND ACETAMINOPHEN 10; 325 MG/1; MG/1
1 TABLET ORAL
Status: COMPLETED | OUTPATIENT
Start: 2024-05-07 | End: 2024-05-07

## 2024-05-07 RX ORDER — NALOXONE HYDROCHLORIDE 4 MG/.1ML
1 SPRAY NASAL PRN
Qty: 1 EACH | Refills: 0 | Status: SHIPPED | OUTPATIENT
Start: 2024-05-07

## 2024-05-07 RX ORDER — HYDROCODONE BITARTRATE AND ACETAMINOPHEN 10; 325 MG/1; MG/1
1 TABLET ORAL EVERY 6 HOURS PRN
Qty: 15 TABLET | Refills: 0 | Status: SHIPPED | OUTPATIENT
Start: 2024-05-07 | End: 2024-05-12

## 2024-05-07 RX ADMIN — ONDANSETRON 4 MG: 4 TABLET, ORALLY DISINTEGRATING ORAL at 00:52

## 2024-05-07 RX ADMIN — HYDROCODONE BITARTRATE AND ACETAMINOPHEN 1 TABLET: 10; 325 TABLET ORAL at 00:52

## 2024-05-07 ASSESSMENT — ENCOUNTER SYMPTOMS
NAUSEA: 1
ABDOMINAL PAIN: 0
CONSTIPATION: 0
SORE THROAT: 0
VISUAL CHANGE: 0
DIARRHEA: 0
BACK PAIN: 0
VOMITING: 0
SHORTNESS OF BREATH: 0
COUGH: 0
RHINORRHEA: 0
COLOR CHANGE: 0

## 2024-05-07 ASSESSMENT — PAIN DESCRIPTION - LOCATION: LOCATION: SHOULDER

## 2024-05-07 ASSESSMENT — LIFESTYLE VARIABLES
HOW MANY STANDARD DRINKS CONTAINING ALCOHOL DO YOU HAVE ON A TYPICAL DAY: 1 OR 2
HOW OFTEN DO YOU HAVE A DRINK CONTAINING ALCOHOL: MONTHLY OR LESS

## 2024-05-07 ASSESSMENT — PAIN DESCRIPTION - ORIENTATION: ORIENTATION: LEFT

## 2024-05-07 ASSESSMENT — PAIN SCALES - GENERAL: PAINLEVEL_OUTOF10: 8

## 2024-05-07 NOTE — TELEPHONE ENCOUNTER
Returned call to Pt and advised sched 5/8/2024  DT-Office w/ Dr. Ruelas. Pt voiced complete understanding. LH

## 2024-05-07 NOTE — TELEPHONE ENCOUNTER
Pt called has a shattered shld need sx.  She fell in a resturant was seen er.she said   She need sx.please call thanks

## 2024-05-07 NOTE — ED PROVIDER NOTES
Emergency Department Provider Note       PCP: Mo Jackson DO   Age: 68 y.o.   Sex: female     DISPOSITION Decision To Discharge 05/07/2024 02:24:20 AM       ICD-10-CM    1. Humeral head fracture, left, closed, initial encounter  S42.292A HYDROcodone-acetaminophen (NORCO)  MG per tablet          Medical Decision Making     Patient with a comminuted left humeral head fracture after fall.  Discussed with Ortho and will place patient in a sling and give Norco for pain.  Will discharge with follow-up in the office     1 or more acute illnesses that pose a threat to life or bodily function.   Prescription drug management performed.  Parental controlled substances given in the ED.  Patient was discharged risks and benefits of hospitalization were considered.  Discussion with external consultants.  Shared medical decision making was utilized in creating the patients health plan today.    I independently ordered and reviewed each unique test.     The patients assessment required an independent historian: daughter.  The reason they were needed is important historical information not provided by the patient.  I interpreted the X-rays left humeral head fracture.  No elbow fracture.  No infiltrate..  I interpreted the CT Scan no hemorrhage.    The management of this patient was discussed with an external consultant.    Patient has minor blunt head trauma and head CT was ordered as patient had vomiting.        History     Patient was brought in by EMS from a restaurant.  He tripped and fell landing on her left shoulder.  Has pain in the shoulder area.  Radiates down to the elbow.  No numbness or weakness.  Was able to ambulate afterwards.  Minimal hip pain.  Slight abrasion to left knee.  Here for evaluation.  Unsure if she hit her head or lost consciousness.  Has some mild nausea.    The history is provided by the patient. No  was used.   Fall  The accident occurred 1 to 2 hours ago. The fall

## 2024-05-07 NOTE — ED TRIAGE NOTES
Patient arrives via EMS after falling at a restaurant. Patient tripped on a step and fell onto her shoulder.

## 2024-05-07 NOTE — ED NOTES
Patient mobility status  with no difficulty. Provider aware     I have reviewed discharge instructions with the patient.  The patient verbalized understanding.    Patient left ED via Discharge Method: wheelchair to Home with Child.    Opportunity for questions and clarification provided.     Patient given 2 scripts.           Kemi Castellon LPN  05/07/24 0238

## 2024-05-08 ENCOUNTER — OFFICE VISIT (OUTPATIENT)
Dept: ORTHOPEDIC SURGERY | Age: 69
End: 2024-05-08

## 2024-05-08 DIAGNOSIS — S42.292A OTHER CLOSED DISPLACED FRACTURE OF PROXIMAL END OF LEFT HUMERUS, INITIAL ENCOUNTER: Primary | ICD-10-CM

## 2024-05-08 NOTE — PROGRESS NOTES
Subjective: She fell yesterday on outstretched left arm resulting in fracture comminuted surgical neck left humerus for which she was placed in a sling and referred for outpatient care.  She has no particular complaints other than related to the left shoulder.    Objective: There are some swelling around the left shoulder.  There is a fair amount of ecchymosis in the distal arm above the elbow.  No neurovascular deficit left hand.    X-rays: X-rays were repeated this morning of the left shoulder and AP and Y view, so as to get a better Y-view and this reveals that there is no gross displacement at the fracture site and the joint is well aligned.  This is a impacted comminuted fracture with some mild displacement of the greater tuberosity fragment.    A&P: Comminuted mildly displaced fracture surgical neck left humerus.  There is no surgical indication here.    X-rays reviewed with the patient and she is appraised of the plan of care.  She will keep the arm in the sling but she can take the sling off for gentle range of motion of the left elbow and for bathing etc.  She can prop the arm up on folded blankets or pillows while sleeping and sitting, for comfort.    Will see her back in 3 weeks for follow-up with x-rays of the left shoulder and AP and Y views at which time she can be started on pendulum exercises to be graduated over 3 weeks for passive flexion and abduction exercises etc.  She can certainly be referred to OT or PT for this.

## 2024-05-14 ENCOUNTER — TELEPHONE (OUTPATIENT)
Dept: ORTHOPEDIC SURGERY | Age: 69
End: 2024-05-14

## 2024-05-14 DIAGNOSIS — S42.292A HUMERAL HEAD FRACTURE, LEFT, CLOSED, INITIAL ENCOUNTER: ICD-10-CM

## 2024-05-14 RX ORDER — HYDROCODONE BITARTRATE AND ACETAMINOPHEN 10; 325 MG/1; MG/1
1 TABLET ORAL EVERY 6 HOURS PRN
Qty: 12 TABLET | Refills: 0 | Status: SHIPPED | OUTPATIENT
Start: 2024-05-14 | End: 2024-05-17

## 2024-05-14 NOTE — TELEPHONE ENCOUNTER
----- Message from Katalina Ardon sent at 5/14/2024  1:13 PM EDT -----  Regarding: Work Absence  Contact: [phone]  The hospital wrote a script of 15 tablets for Norco 10-325mg, every 6hrs.  It was filled 5/7.  I have been limiting dosage, only taking when I really struggle with the pain.  I have 3 left.  Is it possible to get a refill?

## 2024-05-14 NOTE — TELEPHONE ENCOUNTER
----- Message from Katalina Ardon sent at 5/14/2024  1:13 PM EDT -----  Regarding: Work Absence  Contact: [phone]  The hospital wrote a script of 15 tablets for Norco 10-325mg, every 6hrs.  It was filled 5/7.  I have been limiting dosage, only taking when I really struggle with the pain.  I have 3 left.  Is it possible to get a refill?

## 2024-05-28 ENCOUNTER — OFFICE VISIT (OUTPATIENT)
Dept: ORTHOPEDIC SURGERY | Age: 69
End: 2024-05-28

## 2024-05-28 DIAGNOSIS — S42.292A OTHER CLOSED DISPLACED FRACTURE OF PROXIMAL END OF LEFT HUMERUS, INITIAL ENCOUNTER: Primary | ICD-10-CM

## 2024-05-28 PROCEDURE — 99024 POSTOP FOLLOW-UP VISIT: CPT | Performed by: ORTHOPAEDIC SURGERY

## 2024-05-28 NOTE — PROGRESS NOTES
Progress Note    Patient: Katalina Ardon MRN: [id number]  SSN: [id number]    YOB: 1955  Age: 68 y.o.  Sex: female        5/28/2024      Subjective:     Patient is here today in follow-up.  She is about 3 weeks out from nonoperative treatment of a left proximal humerus fracture.  This is a fracture line through the greater tuberosity as well as through the surgical neck area.  She says it definitely feels better but still bothers her to do a lot of range of motion type things with her shoulder itself.    Objective:     There were no vitals filed for this visit.       Physical Exam:     Skin - no open wounds or pressure sores  Motor and sensory function intact in LEFT UPPER extremity  Pulses palpable in LEFT UPPER extremity     XRAY FINDINGS:  [id number] left proximal humerus fracture, findings-true AP and scapular Y views of the left shoulder shows a left proximal humerus fracture still well aligned on both views.  The primary fracture lines show very early evidence of healing as well.  Impression-healing well aligned left proximal humerus fracture    Assessment:     Healing left proximal humerus fracture now about 3 weeks out    Plan:     I think we can allow her to start doing some formal physical therapy.  This will be outpatient therapy for full active and passive range of motion of the left shoulder.  She can also be light strengthening exercise up to 10 pounds resistance.  I will see her back in about 6 weeks with true AP and scapular Y views of the left shoulder on return hopefully can allow her to return to driving at that time.  She will be about 9 weeks out    Signed By: Ebenezer Ng MD     May 28, 2024

## 2024-05-29 ENCOUNTER — TELEPHONE (OUTPATIENT)
Age: 69
End: 2024-05-29

## 2024-05-29 NOTE — TELEPHONE ENCOUNTER
LVM for pt to call back ~ pt sent in a my chart appt request \"Annual follow up; had ablation 7/2023.\" Pt can be scheduled at first available

## 2024-07-16 ENCOUNTER — OFFICE VISIT (OUTPATIENT)
Dept: ORTHOPEDIC SURGERY | Age: 69
End: 2024-07-16

## 2024-07-16 DIAGNOSIS — S42.292A OTHER CLOSED DISPLACED FRACTURE OF PROXIMAL END OF LEFT HUMERUS, INITIAL ENCOUNTER: Primary | ICD-10-CM

## 2024-07-16 PROCEDURE — 99024 POSTOP FOLLOW-UP VISIT: CPT | Performed by: ORTHOPAEDIC SURGERY

## 2024-07-16 NOTE — PROGRESS NOTES
Progress Note    Patient: Katalina Ardon MRN: [id number]  SSN: [id number]    YOB: 1955  Age: 68 y.o.  Sex: female        7/16/2024      Subjective:     Patient is here today in follow-up.  She is now getting close to 3 months out from nonoperative treatment of a left surgical neck proximal humerus fracture.  Since that she is doing okay but she is still having a couple of issues.  She says she notices a couple of sort of knots in the musculature around the arm area this is really a little bit distal to her where her fracture is and this does cause her from some discomfort.  She says she is making progress with therapy but she still is pretty limited and is not quite able to get to 90 degrees but she definitely again is making progress    Objective:     There were no vitals filed for this visit.       Physical Exam:     Skin - no open wounds or pressure sores  Motor and sensory function intact in LEFT UPPER extremity  Pulses palpable in LEFT UPPER extremity   She can actively elevate only to about 75 to 80 degrees in the office today  XRAY FINDINGS:  [id number] left proximal humerus fracture, findings-true AP and lateral views of the left proximal humerus show that the proximal humerus fracture shows significant evidence of healing on both AP and lateral projection.  The overall alignment is satisfactory.  Impression-healing well aligned left proximal humerus fracture    Assessment:     #1 healing well aligned left proximal humerus fracture 2 left shoulder contracture    Plan:     So I think the big thing is just to continue with therapy and try to get her little bit more motion and I think if she can get about 90 degrees she would be able to get her hand to her head and be a little bit happier with this and she agrees.  I think the other issues she has had thing they are just some issues with the musculature probably in her deltoid and her biceps I just encouraged her that I think as time

## 2025-01-16 DIAGNOSIS — E55.9 VITAMIN D DEFICIENCY: ICD-10-CM

## 2025-01-16 DIAGNOSIS — Z00.00 LABORATORY EXAMINATION ORDERED AS PART OF A ROUTINE GENERAL MEDICAL EXAMINATION: Primary | ICD-10-CM

## 2025-01-17 ENCOUNTER — LAB (OUTPATIENT)
Dept: FAMILY MEDICINE CLINIC | Facility: CLINIC | Age: 70
End: 2025-01-17

## 2025-01-17 DIAGNOSIS — E55.9 VITAMIN D DEFICIENCY: ICD-10-CM

## 2025-01-17 DIAGNOSIS — Z00.00 LABORATORY EXAMINATION ORDERED AS PART OF A ROUTINE GENERAL MEDICAL EXAMINATION: ICD-10-CM

## 2025-01-17 LAB
25(OH)D3 SERPL-MCNC: 27.9 NG/ML (ref 30–100)
ALBUMIN SERPL-MCNC: 4 G/DL (ref 3.2–4.6)
ALBUMIN/GLOB SERPL: 1.3 (ref 1–1.9)
ALP SERPL-CCNC: 191 U/L (ref 35–104)
ALT SERPL-CCNC: 33 U/L (ref 8–45)
ANION GAP SERPL CALC-SCNC: 11 MMOL/L (ref 7–16)
AST SERPL-CCNC: 35 U/L (ref 15–37)
BILIRUB SERPL-MCNC: 0.5 MG/DL (ref 0–1.2)
BILIRUBIN, URINE, POC: NEGATIVE
BLOOD URINE, POC: ABNORMAL
BUN SERPL-MCNC: 16 MG/DL (ref 8–23)
CALCIUM SERPL-MCNC: 9.5 MG/DL (ref 8.8–10.2)
CHLORIDE SERPL-SCNC: 103 MMOL/L (ref 98–107)
CHOLEST SERPL-MCNC: 208 MG/DL (ref 0–200)
CO2 SERPL-SCNC: 27 MMOL/L (ref 20–29)
CREAT SERPL-MCNC: 0.71 MG/DL (ref 0.6–1.1)
GLOBULIN SER CALC-MCNC: 3.1 G/DL (ref 2.3–3.5)
GLUCOSE SERPL-MCNC: 93 MG/DL (ref 70–99)
GLUCOSE URINE, POC: NEGATIVE
GRANS ABSOLUTE, POC: 3.4 K/UL
GRANULOCYTES %, POC: 57.3 %
HDLC SERPL-MCNC: 52 MG/DL (ref 40–60)
HDLC SERPL: 4 (ref 0–5)
HEMATOCRIT, POC: 44 %
HEMOGLOBIN, POC: 14.2 G/DL
KETONES, URINE, POC: NEGATIVE
LDLC SERPL CALC-MCNC: 127 MG/DL (ref 0–100)
LEUKOCYTE ESTERASE, URINE, POC: ABNORMAL
LYMPHOCYTE %, POC: 32.8 %
LYMPHS ABSOLUTE, POC: 2 K/UL
MCH, POC: ABNORMAL PG (ref 40–?)
MCHC, POC: 32.3
MCV, POC: 97
MONOCYTE %, POC: 9.9 %
MONOCYTE, ABSOLUTE POC: 0.6 K/UL
MPV, POC: 7.8 FL
NITRITE, URINE, POC: NEGATIVE
PH, URINE, POC: 6.5 (ref 4.6–8)
PLATELET COUNT, POC: ABNORMAL K/UL
POTASSIUM SERPL-SCNC: 4.6 MMOL/L (ref 3.5–5.1)
PROT SERPL-MCNC: 7.1 G/DL (ref 6.3–8.2)
PROTEIN,URINE, POC: NEGATIVE
RBC, POC: 4.54 M/UL
RDW, POC: 12.8 %
SODIUM SERPL-SCNC: 141 MMOL/L (ref 136–145)
SPECIFIC GRAVITY, URINE, POC: 1.02 (ref 1–1.03)
TRIGL SERPL-MCNC: 145 MG/DL (ref 0–150)
TSH, 3RD GENERATION: 0.77 UIU/ML (ref 0.27–4.2)
URINALYSIS CLARITY, POC: CLEAR
URINALYSIS COLOR, POC: YELLOW
UROBILINOGEN, POC: NORMAL
VLDLC SERPL CALC-MCNC: 29 MG/DL (ref 6–23)
WBC, POC: 6 K/UL

## 2025-01-23 ENCOUNTER — OFFICE VISIT (OUTPATIENT)
Dept: FAMILY MEDICINE CLINIC | Facility: CLINIC | Age: 70
End: 2025-01-23

## 2025-01-23 VITALS
HEIGHT: 67 IN | DIASTOLIC BLOOD PRESSURE: 70 MMHG | SYSTOLIC BLOOD PRESSURE: 100 MMHG | BODY MASS INDEX: 26.06 KG/M2 | WEIGHT: 166 LBS

## 2025-01-23 DIAGNOSIS — Z00.00 MEDICARE WELCOME EXAM: Primary | ICD-10-CM

## 2025-01-23 DIAGNOSIS — Z78.0 POSTMENOPAUSAL: ICD-10-CM

## 2025-01-23 DIAGNOSIS — Z13.31 SCREENING FOR DEPRESSION: ICD-10-CM

## 2025-01-23 DIAGNOSIS — Z12.11 SPECIAL SCREENING FOR MALIGNANT NEOPLASMS, COLON: ICD-10-CM

## 2025-01-23 DIAGNOSIS — Z12.31 OTHER SCREENING MAMMOGRAM: ICD-10-CM

## 2025-01-23 DIAGNOSIS — Z00.00 ROUTINE GENERAL MEDICAL EXAMINATION AT A HEALTH CARE FACILITY: ICD-10-CM

## 2025-01-23 DIAGNOSIS — Z00.00 WELCOME TO MEDICARE PREVENTIVE VISIT: ICD-10-CM

## 2025-01-23 SDOH — ECONOMIC STABILITY: FOOD INSECURITY: WITHIN THE PAST 12 MONTHS, THE FOOD YOU BOUGHT JUST DIDN'T LAST AND YOU DIDN'T HAVE MONEY TO GET MORE.: NEVER TRUE

## 2025-01-23 SDOH — ECONOMIC STABILITY: FOOD INSECURITY: WITHIN THE PAST 12 MONTHS, YOU WORRIED THAT YOUR FOOD WOULD RUN OUT BEFORE YOU GOT MONEY TO BUY MORE.: NEVER TRUE

## 2025-01-23 ASSESSMENT — PATIENT HEALTH QUESTIONNAIRE - PHQ9
SUM OF ALL RESPONSES TO PHQ QUESTIONS 1-9: 0
1. LITTLE INTEREST OR PLEASURE IN DOING THINGS: NOT AT ALL
SUM OF ALL RESPONSES TO PHQ QUESTIONS 1-9: 0
2. FEELING DOWN, DEPRESSED OR HOPELESS: NOT AT ALL
SUM OF ALL RESPONSES TO PHQ9 QUESTIONS 1 & 2: 0

## 2025-01-23 ASSESSMENT — ENCOUNTER SYMPTOMS
ABDOMINAL PAIN: 0
SHORTNESS OF BREATH: 0
COUGH: 0

## 2025-01-23 NOTE — PATIENT INSTRUCTIONS
We noticed on a recent visit that we missed an opportunity to add the additional resources you requested. We have attached them to the After Visit Summary for the date of service 1/23/2025 for your review.  Should you have any questions regarding the resources, please contact our office.    Joint venture between [facility] [employer] [facility] [employer] - Financial Resources*  (Call [facility]/211 if you need more resources.)      Utility Assistance     [facility] Low Income Home Energy Assistance Program  They offer: energy assistance.  Contact: [phone]; [email or web address]  Helpful Info: Must be a [place] resident and need financial assistance with home energy costs.    [employer]   They offer: wide range of services to low and moderate-income residents in [facility]  Contact: [phone]; [address] [place] [address]    [facility]   They offer: basic needs for stability and support services.   Contact: [phone]; [address] [place] [address]     [facility]   They offer: help for families and individuals in need to pay rent and utility bills, purchase clothing, and food.    Contact: [phone]; [address], [place] [address] [employer]   They offer:  assistance with utility bills   Contact:  [phone]; [address] [place] [address]       [place] [employer]   They offer: Bill, clothes, food, and rent assistance.  Case Management and Counseling services available.   Contact: [phone] [facility] [place] [address]    Medical/Financial  [facility] Financial Assistance  They offer: help uninsured patients who do not qualify for government-sponsored health insurance and cannot afford to pay for their medical care. Insured patients may also qualify depending on family income, family size, and medical needs.   Contact: [phone]   Helpful Info: How to

## 2025-01-23 NOTE — PROGRESS NOTES
PROGRESS NOTE    SUBJECTIVE:   Katalina Ardon is a 69 y.o. female seen for a follow up visit regarding the following chief complaint:     Chief Complaint   Patient presents with    Annual Exam           HPI patient presents office today for a welcome to Medicare visit      Past Medical History, Past Surgical History, Family history, Social History, and Medications were all reviewed with the patient today and updated as necessary.       Current Outpatient Medications   Medication Sig Dispense Refill    apixaban (ELIQUIS) 5 MG TABS tablet Take 1 tablet by mouth 2 times daily      metoprolol succinate (TOPROL XL) 50 MG extended release tablet Take 1 tablet by mouth daily 90 tablet 3    Cholecalciferol (VITAMIN D3) 50 MCG (2000 UT) CAPS Take by mouth      Multiple Vitamins-Minerals (MULTIVITAMIN ADULT, MINERALS, PO) Take 1 tablet by mouth daily       No current facility-administered medications for this visit.     Allergies   Allergen Reactions    Codeine Itching     Patient Active Problem List   Diagnosis    Non-pressure chronic ulcer of left ankle with fat layer exposed (HCC)    Chronic venous hypertension with ulcer and inflammation involving left side (HCC)    Elevated brain natriuretic peptide (BNP) level    GARNER (dyspnea on exertion)    Secondary hypercoagulable state (HCC)    S/P ablation of atrial fibrillation     No past medical history on file.  Past Surgical History:   Procedure Laterality Date    BREAST SURGERY      Cyst Removal    EP DEVICE PROCEDURE N/A 7/12/2023    Ablation A-fib w complete ep study performed by Diaz Lawrence MD at [facility] CARDIAC CATH LAB    GYN      US BREAST BIOPSY W LOC DEVICE 1ST LESION RIGHT Right 5/4/2023    US BREAST BIOPSY W LOC DEVICE 1ST LESION RIGHT 5/4/2023 [place] [facility] [place]     Family History   Problem Relation Age of Onset    Heart Disease Father     Heart Disease Mother      Social History     Tobacco Use    Smoking status: Never     Passive exposure:

## 2025-02-03 ENCOUNTER — OFFICE VISIT (OUTPATIENT)
Age: 70
End: 2025-02-03

## 2025-02-03 VITALS
DIASTOLIC BLOOD PRESSURE: 86 MMHG | BODY MASS INDEX: 25.74 KG/M2 | HEART RATE: 99 BPM | SYSTOLIC BLOOD PRESSURE: 122 MMHG | WEIGHT: 164 LBS | HEIGHT: 67 IN

## 2025-02-03 DIAGNOSIS — I48.19 PERSISTENT ATRIAL FIBRILLATION (HCC): Primary | ICD-10-CM

## 2025-02-03 RX ORDER — METOPROLOL SUCCINATE 50 MG/1
50 TABLET, EXTENDED RELEASE ORAL DAILY
Qty: 90 TABLET | Refills: 3 | Status: SHIPPED | OUTPATIENT
Start: 2025-02-03

## 2025-02-03 ASSESSMENT — ENCOUNTER SYMPTOMS
ALLERGIC/IMMUNOLOGIC NEGATIVE: 1
SHORTNESS OF BREATH: 0
GASTROINTESTINAL NEGATIVE: 1
EYES NEGATIVE: 1

## 2025-02-03 NOTE — PROGRESS NOTES
Hematological: Negative.    Psychiatric/Behavioral: Negative.     All other systems reviewed and are negative.        PHYSICAL EXAM:   /86   Pulse 99   Ht 1.702 m (5' 7\")   Wt 74.4 kg (164 lb)   BMI 25.69 kg/m²      Wt Readings from Last 3 Encounters:   02/03/25 74.4 kg (164 lb)   01/23/25 75.3 kg (166 lb)   05/07/24 76.7 kg (169 lb)     BP Readings from Last 3 Encounters:   02/03/25 122/86   01/23/25 100/70   05/07/24 104/69     Gen: Well appearing, well developed, no acute distress  Eyes: Pupils equal, round. Extraocular movements are intact  ENT: Oropharynx clear, no oral lesions, normal dentition  CV: S1S2, RRR, no murmurs, rubs or gallops, normal JVD, no carotid bruits, normal distal pulses, no ADÁN  Pulm: Clear to auscultation bilaterally, no accessory muscle uses, no wheezes or rales  GI: Soft, NT, ND, +BS  Neuro: Alert and oriented, nonfocal  Psych: Appropriate affect  Skin: Normal color and skin turgor  MSK: Normal muscle bulk and tone    Medical problems and test results were reviewed with the patient today.     No results found for any visits on 02/03/25.

## 2025-02-14 ENCOUNTER — OFFICE VISIT (OUTPATIENT)
Age: 70
End: 2025-02-14
Payer: MEDICARE

## 2025-02-14 VITALS
DIASTOLIC BLOOD PRESSURE: 78 MMHG | HEIGHT: 67 IN | SYSTOLIC BLOOD PRESSURE: 110 MMHG | BODY MASS INDEX: 25.9 KG/M2 | WEIGHT: 165 LBS | HEART RATE: 86 BPM

## 2025-02-14 DIAGNOSIS — I48.19 ATRIAL FIBRILLATION, PERSISTENT (HCC): Primary | ICD-10-CM

## 2025-02-14 PROCEDURE — 1090F PRES/ABSN URINE INCON ASSESS: CPT | Performed by: INTERNAL MEDICINE

## 2025-02-14 PROCEDURE — G8399 PT W/DXA RESULTS DOCUMENT: HCPCS | Performed by: INTERNAL MEDICINE

## 2025-02-14 PROCEDURE — 1036F TOBACCO NON-USER: CPT | Performed by: INTERNAL MEDICINE

## 2025-02-14 PROCEDURE — G8427 DOCREV CUR MEDS BY ELIG CLIN: HCPCS | Performed by: INTERNAL MEDICINE

## 2025-02-14 PROCEDURE — 1126F AMNT PAIN NOTED NONE PRSNT: CPT | Performed by: INTERNAL MEDICINE

## 2025-02-14 PROCEDURE — 3017F COLORECTAL CA SCREEN DOC REV: CPT | Performed by: INTERNAL MEDICINE

## 2025-02-14 PROCEDURE — 1160F RVW MEDS BY RX/DR IN RCRD: CPT | Performed by: INTERNAL MEDICINE

## 2025-02-14 PROCEDURE — G8419 CALC BMI OUT NRM PARAM NOF/U: HCPCS | Performed by: INTERNAL MEDICINE

## 2025-02-14 PROCEDURE — 1123F ACP DISCUSS/DSCN MKR DOCD: CPT | Performed by: INTERNAL MEDICINE

## 2025-02-14 PROCEDURE — 99213 OFFICE O/P EST LOW 20 MIN: CPT | Performed by: INTERNAL MEDICINE

## 2025-02-14 PROCEDURE — 1159F MED LIST DOCD IN RCRD: CPT | Performed by: INTERNAL MEDICINE

## 2025-02-14 ASSESSMENT — ENCOUNTER SYMPTOMS
BOWEL INCONTINENCE: 0
HOARSE VOICE: 0
COUGH: 0
WHEEZING: 0
ABDOMINAL PAIN: 0
ORTHOPNEA: 0
HEMATOCHEZIA: 0
SHORTNESS OF BREATH: 0
BLURRED VISION: 0
SPUTUM PRODUCTION: 0
NAUSEA: 0
COLOR CHANGE: 0
HEMATEMESIS: 0
DIARRHEA: 0
VOMITING: 0

## 2025-02-14 NOTE — PROGRESS NOTES
[facility] CARDIOLOGY  [address] [place] [address], [address]  [place] [address]  PHONE: [phone]        25        NAME:  Katalina rAdon  : 1955  MRN: [id number]       SUBJECTIVE:   Katalina Ardon is a 69 y.o. female seen for a follow up visit regarding the following: The patient has a hx of AF ablation.She returns for annual follow up.She reports doing well.She reports rare ,brief palpitations.    Chief Complaint   Patient presents with    Atrial Fibrillation       HPI:    Atrial Fibrillation  Presents for follow-up visit. Symptoms include palpitations. Symptoms are negative for an AICD problem, bradycardia, chest pain, dizziness, hemodynamic instability, hypertension, hypotension, pacemaker problem, shortness of breath, syncope, tachycardia and weakness. The symptoms have been stable.   Palpitations   This is a recurrent problem. The problem occurs rarely. The problem has been resolved. Episode Length: Usually a few seconds. Nothing aggravates the symptoms. Pertinent negatives include no anxiety, chest fullness, chest pain, coughing, diaphoresis, dizziness, fever, irregular heartbeat, malaise/fatigue, nausea, near-syncope, numbness, shortness of breath, syncope, vomiting or weakness. She has tried beta blockers for the symptoms. The treatment provided significant relief.       Past Medical History, Past Surgical History, Family history, Social History, and Medications were all reviewed with the patient today and updated as necessary.         Current Outpatient Medications:     apixaban (ELIQUIS) 5 MG TABS tablet, Take 1 tablet by mouth 2 times daily, Disp: 180 tablet, Rfl: 3    metoprolol succinate (TOPROL XL) 50 MG extended release tablet, Take 1 tablet by mouth daily, Disp: 90 tablet, Rfl: 3    Cholecalciferol (VITAMIN D3) 50 MCG (2000) CAPS, Take by mouth, Disp: , Rfl:     Multiple Vitamins-Minerals (MULTIVITAMIN ADULT, MINERALS, PO), Take 1 tablet by mouth daily, Disp: , Rfl:   Allergies

## 2025-07-10 ENCOUNTER — LAB (OUTPATIENT)
Dept: FAMILY MEDICINE CLINIC | Facility: CLINIC | Age: 70
End: 2025-07-10
Payer: MEDICARE

## 2025-07-10 DIAGNOSIS — Z00.00 LABORATORY EXAMINATION ORDERED AS PART OF A ROUTINE GENERAL MEDICAL EXAMINATION: Primary | ICD-10-CM

## 2025-07-10 DIAGNOSIS — Z00.00 MEDICARE WELCOME EXAM: ICD-10-CM

## 2025-07-10 DIAGNOSIS — Z00.00 ROUTINE GENERAL MEDICAL EXAMINATION AT A HEALTH CARE FACILITY: ICD-10-CM

## 2025-07-10 LAB
ALBUMIN SERPL-MCNC: 3.9 G/DL (ref 3.2–4.6)
ALBUMIN/GLOB SERPL: 1.3 (ref 1–1.9)
ALP SERPL-CCNC: 136 U/L (ref 35–104)
ALT SERPL-CCNC: 26 U/L (ref 8–45)
ANION GAP SERPL CALC-SCNC: 9 MMOL/L (ref 7–16)
AST SERPL-CCNC: 23 U/L (ref 15–37)
BASOPHILS # BLD: 0.03 K/UL (ref 0–0.2)
BASOPHILS NFR BLD: 0.5 % (ref 0–2)
BILIRUB SERPL-MCNC: 0.6 MG/DL (ref 0–1.2)
BILIRUBIN, URINE, POC: NEGATIVE
BLOOD URINE, POC: NEGATIVE
BUN SERPL-MCNC: 17 MG/DL (ref 8–23)
CALCIUM SERPL-MCNC: 9.6 MG/DL (ref 8.8–10.2)
CHLORIDE SERPL-SCNC: 104 MMOL/L (ref 98–107)
CHOLEST SERPL-MCNC: 169 MG/DL (ref 0–200)
CO2 SERPL-SCNC: 27 MMOL/L (ref 20–29)
CREAT SERPL-MCNC: 0.64 MG/DL (ref 0.6–1.1)
DIFFERENTIAL METHOD BLD: NORMAL
EOSINOPHIL # BLD: 0.1 K/UL (ref 0–0.8)
EOSINOPHIL NFR BLD: 1.8 % (ref 0.5–7.8)
ERYTHROCYTE [DISTWIDTH] IN BLOOD BY AUTOMATED COUNT: 12.3 % (ref 11.9–14.6)
GLOBULIN SER CALC-MCNC: 2.9 G/DL (ref 2.3–3.5)
GLUCOSE SERPL-MCNC: 89 MG/DL (ref 70–99)
GLUCOSE URINE, POC: NEGATIVE
HCT VFR BLD AUTO: 42.2 % (ref 35.8–46.3)
HDLC SERPL-MCNC: 46 MG/DL (ref 40–60)
HDLC SERPL: 3.7 (ref 0–5)
HGB BLD-MCNC: 13.6 G/DL (ref 11.7–15.4)
IMM GRANULOCYTES # BLD AUTO: 0.01 K/UL (ref 0–0.5)
IMM GRANULOCYTES NFR BLD AUTO: 0.2 % (ref 0–5)
KETONES, URINE, POC: NEGATIVE
LDLC SERPL CALC-MCNC: 104 MG/DL (ref 0–100)
LEUKOCYTE ESTERASE, URINE, POC: NEGATIVE
LYMPHOCYTES # BLD: 1.4 K/UL (ref 0.5–4.6)
LYMPHOCYTES NFR BLD: 25.3 % (ref 13–44)
MCH RBC QN AUTO: 30.7 PG (ref 26.1–32.9)
MCHC RBC AUTO-ENTMCNC: 32.2 G/DL (ref 31.4–35)
MCV RBC AUTO: 95.3 FL (ref 82–102)
MONOCYTES # BLD: 0.5 K/UL (ref 0.1–1.3)
MONOCYTES NFR BLD: 9 % (ref 4–12)
NEUTS SEG # BLD: 3.49 K/UL (ref 1.7–8.2)
NEUTS SEG NFR BLD: 63.2 % (ref 43–78)
NITRITE, URINE, POC: NEGATIVE
NRBC # BLD: 0 K/UL (ref 0–0.2)
PH, URINE, POC: 6.5 (ref 4.6–8)
PLATELET # BLD AUTO: 248 K/UL (ref 150–450)
PMV BLD AUTO: 11.7 FL (ref 9.4–12.3)
POTASSIUM SERPL-SCNC: 5 MMOL/L (ref 3.5–5.1)
PROT SERPL-MCNC: 6.8 G/DL (ref 6.3–8.2)
PROTEIN,URINE, POC: NEGATIVE
RBC # BLD AUTO: 4.43 M/UL (ref 4.05–5.2)
SODIUM SERPL-SCNC: 140 MMOL/L (ref 136–145)
SPECIFIC GRAVITY, URINE, POC: 1.01 (ref 1–1.03)
TRIGL SERPL-MCNC: 95 MG/DL (ref 0–150)
TSH W FREE THYROID IF ABNORMAL: 0.74 UIU/ML (ref 0.27–4.2)
URINALYSIS CLARITY, POC: CLEAR
URINALYSIS COLOR, POC: YELLOW
UROBILINOGEN, POC: NORMAL
VLDLC SERPL CALC-MCNC: 19 MG/DL (ref 6–23)
WBC # BLD AUTO: 5.5 K/UL (ref 4.3–11.1)

## 2025-07-10 PROCEDURE — 81003 URINALYSIS AUTO W/O SCOPE: CPT | Performed by: FAMILY MEDICINE

## 2025-07-10 PROCEDURE — 36415 COLL VENOUS BLD VENIPUNCTURE: CPT | Performed by: FAMILY MEDICINE

## 2025-07-17 ENCOUNTER — OFFICE VISIT (OUTPATIENT)
Dept: FAMILY MEDICINE CLINIC | Facility: CLINIC | Age: 70
End: 2025-07-17
Payer: MEDICARE

## 2025-07-17 VITALS
HEART RATE: 96 BPM | WEIGHT: 167 LBS | BODY MASS INDEX: 26.21 KG/M2 | DIASTOLIC BLOOD PRESSURE: 74 MMHG | SYSTOLIC BLOOD PRESSURE: 102 MMHG | HEIGHT: 67 IN

## 2025-07-17 DIAGNOSIS — R74.01 ELEVATION OF LEVELS OF LIVER TRANSAMINASE LEVELS: ICD-10-CM

## 2025-07-17 DIAGNOSIS — G89.29 CHRONIC NECK PAIN: Primary | ICD-10-CM

## 2025-07-17 DIAGNOSIS — R74.8 ELEVATED ALKALINE PHOSPHATASE LEVEL: ICD-10-CM

## 2025-07-17 DIAGNOSIS — M54.2 CHRONIC NECK PAIN: Primary | ICD-10-CM

## 2025-07-17 DIAGNOSIS — E78.01 FAMILIAL HYPERCHOLESTEROLEMIA: ICD-10-CM

## 2025-07-17 PROCEDURE — 3017F COLORECTAL CA SCREEN DOC REV: CPT | Performed by: FAMILY MEDICINE

## 2025-07-17 PROCEDURE — 99214 OFFICE O/P EST MOD 30 MIN: CPT | Performed by: FAMILY MEDICINE

## 2025-07-17 PROCEDURE — 1036F TOBACCO NON-USER: CPT | Performed by: FAMILY MEDICINE

## 2025-07-17 PROCEDURE — 1123F ACP DISCUSS/DSCN MKR DOCD: CPT | Performed by: FAMILY MEDICINE

## 2025-07-17 PROCEDURE — 1090F PRES/ABSN URINE INCON ASSESS: CPT | Performed by: FAMILY MEDICINE

## 2025-07-17 PROCEDURE — 1159F MED LIST DOCD IN RCRD: CPT | Performed by: FAMILY MEDICINE

## 2025-07-17 PROCEDURE — G8427 DOCREV CUR MEDS BY ELIG CLIN: HCPCS | Performed by: FAMILY MEDICINE

## 2025-07-17 PROCEDURE — G8419 CALC BMI OUT NRM PARAM NOF/U: HCPCS | Performed by: FAMILY MEDICINE

## 2025-07-17 PROCEDURE — G8399 PT W/DXA RESULTS DOCUMENT: HCPCS | Performed by: FAMILY MEDICINE

## 2025-07-17 RX ORDER — MELOXICAM 15 MG/1
15 TABLET ORAL DAILY
Qty: 90 TABLET | Refills: 3 | Status: SHIPPED | OUTPATIENT
Start: 2025-07-17

## 2025-07-17 SDOH — ECONOMIC STABILITY: FOOD INSECURITY: WITHIN THE PAST 12 MONTHS, THE FOOD YOU BOUGHT JUST DIDN'T LAST AND YOU DIDN'T HAVE MONEY TO GET MORE.: NEVER TRUE

## 2025-07-17 SDOH — ECONOMIC STABILITY: FOOD INSECURITY: WITHIN THE PAST 12 MONTHS, YOU WORRIED THAT YOUR FOOD WOULD RUN OUT BEFORE YOU GOT MONEY TO BUY MORE.: NEVER TRUE

## 2025-07-17 ASSESSMENT — ENCOUNTER SYMPTOMS
NAUSEA: 0
VOMITING: 0
SHORTNESS OF BREATH: 0

## 2025-07-17 NOTE — PROGRESS NOTES
PROGRESS NOTE    SUBJECTIVE:   Katalina Ardon is a 69 y.o. female seen for a follow up visit regarding the following chief complaint:     Chief Complaint   Patient presents with    Follow-up     labs           HPI patient presents office follow-up of her cholesterol and complaining of neck pain      Past Medical History, Past Surgical History, Family history, Social History, and Medications were all reviewed with the patient today and updated as necessary.       Current Outpatient Medications   Medication Sig Dispense Refill    meloxicam (MOBIC) 15 MG tablet Take 1 tablet by mouth daily 90 tablet 3    apixaban (ELIQUIS) 5 MG TABS tablet Take 1 tablet by mouth 2 times daily 180 tablet 3    metoprolol succinate (TOPROL XL) 50 MG extended release tablet Take 1 tablet by mouth daily 90 tablet 3    Cholecalciferol (VITAMIN D3) 50 MCG (2000 UT) CAPS Take by mouth      Multiple Vitamins-Minerals (MULTIVITAMIN ADULT, MINERALS, PO) Take 1 tablet by mouth daily       No current facility-administered medications for this visit.     Allergies   Allergen Reactions    Codeine Itching     Patient Active Problem List   Diagnosis    Non-pressure chronic ulcer of left ankle with fat layer exposed (HCC)    Chronic venous hypertension with ulcer and inflammation involving left side (HCC)    Elevated brain natriuretic peptide (BNP) level    GARNER (dyspnea on exertion)    Atrial fibrillation, persistent (HCC)    Secondary hypercoagulable state    S/P ablation of atrial fibrillation     No past medical history on file.  Past Surgical History:   Procedure Laterality Date    BREAST SURGERY      Cyst Removal    EP DEVICE PROCEDURE N/A 7/12/2023    Ablation A-fib w complete ep study performed by Diaz Lawrence MD at [facility] CARDIAC CATH LAB    GYN      US BREAST BIOPSY W LOC DEVICE 1ST LESION RIGHT Right 5/4/2023    US BREAST BIOPSY W LOC DEVICE 1ST LESION RIGHT 5/4/2023 [place] Ren [place] [facility] [place]     Family History   Problem

## (undated) DEVICE — 18G NG KIT WITH 96IN PROBE COVER (10 PK): Brand: SITE-RITE

## (undated) DEVICE — CATHETER MAP D CRV 2-2-2-2-2 MM SPC PERSEID OCTARAY

## (undated) DEVICE — INTRODUCER SHTH 10FR CANN L23CM DIL TIP 45MM FUSCHIA W/

## (undated) DEVICE — INTRODUCER SHTH CARDIAGUIDE FCL20101

## (undated) DEVICE — PINNACLE INTRODUCER SHEATH: Brand: PINNACLE

## (undated) DEVICE — CATHETER EP 7FR L115CM 2-8-2MM SPC TIP 2MM 10 ELECTRD F L

## (undated) DEVICE — CATHETER EP 7FR L115CM 2-8-2MM SPC TIP 2MM 10 ELECTRD FJ

## (undated) DEVICE — SYSTEM CLOSURE 6-12 FR VEN VASC VASCADE MVP

## (undated) DEVICE — CATHETER ABLAT 8FR L115CM 1-6-2MM SPC TIP 3.5MM DF CRV

## (undated) DEVICE — PATCH REF EXT FOR CARTO 3 SYS (EA = 6 PACKS)

## (undated) DEVICE — PINNACLE TIF INTRODUCER SHEATH: Brand: PINNACLE

## (undated) DEVICE — Device: Brand: NRG TRANSSEPTAL NEEDLE

## (undated) DEVICE — SHEATH GUID 11.5X8.5FR L71MM M CRV L22MM BIDIR STEER CARTO

## (undated) DEVICE — PRESSURE MONITORING SET: Brand: TRUWAVE

## (undated) DEVICE — CATHETER US GE COMPATIBILITY SOUNDSTAR ECO 10FR

## (undated) DEVICE — TUBING PMP FOR CARTO SYS SMARTABLATE